# Patient Record
Sex: FEMALE | Race: WHITE | NOT HISPANIC OR LATINO
[De-identification: names, ages, dates, MRNs, and addresses within clinical notes are randomized per-mention and may not be internally consistent; named-entity substitution may affect disease eponyms.]

---

## 2022-04-21 ENCOUNTER — NON-APPOINTMENT (OUTPATIENT)
Age: 87
End: 2022-04-21

## 2022-04-26 PROBLEM — Z00.00 ENCOUNTER FOR PREVENTIVE HEALTH EXAMINATION: Status: ACTIVE | Noted: 2022-04-26

## 2022-05-02 ENCOUNTER — APPOINTMENT (OUTPATIENT)
Dept: CARDIOLOGY | Facility: CLINIC | Age: 87
End: 2022-05-02
Payer: MEDICARE

## 2022-05-02 VITALS — SYSTOLIC BLOOD PRESSURE: 120 MMHG | RESPIRATION RATE: 18 BRPM | DIASTOLIC BLOOD PRESSURE: 80 MMHG | HEART RATE: 84 BPM

## 2022-05-02 VITALS — BODY MASS INDEX: 26.21 KG/M2 | HEIGHT: 67 IN | WEIGHT: 167 LBS

## 2022-05-02 DIAGNOSIS — Z87.11 PERSONAL HISTORY OF PEPTIC ULCER DISEASE: ICD-10-CM

## 2022-05-02 DIAGNOSIS — Z87.891 PERSONAL HISTORY OF NICOTINE DEPENDENCE: ICD-10-CM

## 2022-05-02 DIAGNOSIS — G40.909 EPILEPSY, UNSPECIFIED, NOT INTRACTABLE, W/OUT STATUS EPILEPTICUS: ICD-10-CM

## 2022-05-02 DIAGNOSIS — Z80.8 FAMILY HISTORY OF MALIGNANT NEOPLASM OF OTHER ORGANS OR SYSTEMS: ICD-10-CM

## 2022-05-02 DIAGNOSIS — Z82.5 FAMILY HISTORY OF ASTHMA AND OTHER CHRONIC LOWER RESPIRATORY DISEASES: ICD-10-CM

## 2022-05-02 DIAGNOSIS — Z78.9 OTHER SPECIFIED HEALTH STATUS: ICD-10-CM

## 2022-05-02 DIAGNOSIS — Z82.3 FAMILY HISTORY OF STROKE: ICD-10-CM

## 2022-05-02 PROCEDURE — 93000 ELECTROCARDIOGRAM COMPLETE: CPT

## 2022-05-02 PROCEDURE — 99204 OFFICE O/P NEW MOD 45 MIN: CPT

## 2022-05-02 RX ORDER — LEVOTHYROXINE SODIUM 0.03 MG/1
25 TABLET ORAL
Qty: 90 | Refills: 0 | Status: ACTIVE | COMMUNITY
Start: 2022-04-21

## 2022-05-02 RX ORDER — LEVETIRACETAM 500 MG/1
500 TABLET, FILM COATED ORAL
Qty: 60 | Refills: 0 | Status: ACTIVE | COMMUNITY
Start: 2021-08-30

## 2022-05-02 NOTE — PHYSICAL EXAM
[Well Developed] : well developed [Well Nourished] : well nourished [No Acute Distress] : no acute distress [Normal Conjunctiva] : normal conjunctiva [Normal Venous Pressure] : normal venous pressure [No Carotid Bruit] : no carotid bruit [Normal S1, S2] : normal S1, S2 [No Murmur] : no murmur [No Rub] : no rub [No Gallop] : no gallop [Murmur] : murmur [Clear Lung Fields] : clear lung fields [Good Air Entry] : good air entry [No Respiratory Distress] : no respiratory distress  [Soft] : abdomen soft [Non Tender] : non-tender [No Masses/organomegaly] : no masses/organomegaly [Normal Bowel Sounds] : normal bowel sounds [Normal Gait] : normal gait [No Edema] : no edema [No Cyanosis] : no cyanosis [No Clubbing] : no clubbing [No Varicosities] : no varicosities [No Rash] : no rash [No Skin Lesions] : no skin lesions [Moves all extremities] : moves all extremities [No Focal Deficits] : no focal deficits [Normal Speech] : normal speech [Alert and Oriented] : alert and oriented [Normal memory] : normal memory [de-identified] : II VI systolic

## 2022-05-17 ENCOUNTER — APPOINTMENT (OUTPATIENT)
Dept: CARDIOLOGY | Facility: CLINIC | Age: 87
End: 2022-05-17
Payer: MEDICARE

## 2022-05-17 PROCEDURE — 93306 TTE W/DOPPLER COMPLETE: CPT

## 2022-06-01 ENCOUNTER — APPOINTMENT (OUTPATIENT)
Dept: CARDIOLOGY | Facility: CLINIC | Age: 87
End: 2022-06-01
Payer: MEDICARE

## 2022-06-01 VITALS
DIASTOLIC BLOOD PRESSURE: 80 MMHG | HEIGHT: 67 IN | OXYGEN SATURATION: 96 % | SYSTOLIC BLOOD PRESSURE: 118 MMHG | WEIGHT: 164 LBS | BODY MASS INDEX: 25.74 KG/M2 | TEMPERATURE: 96.7 F | HEART RATE: 81 BPM

## 2022-06-01 PROCEDURE — 93000 ELECTROCARDIOGRAM COMPLETE: CPT

## 2022-06-01 PROCEDURE — 99205 OFFICE O/P NEW HI 60 MIN: CPT

## 2022-06-01 RX ORDER — ALBUTEROL SULFATE 90 UG/1
108 (90 BASE) INHALANT RESPIRATORY (INHALATION)
Qty: 8 | Refills: 0 | Status: DISCONTINUED | COMMUNITY
Start: 2022-04-14 | End: 2022-06-01

## 2022-06-01 NOTE — HISTORY OF PRESENT ILLNESS
[FreeTextEntry1] : Ms. MILLER is a 87 year-year old female with history of HTN, paroxysmal atrial fibrillation, PUD with UGIB, COPD, seizure, Hypothyroidism  is here for management of Atrial Fibrillation.\par \par PUD- ~ 2 years ago- thought to be due to 'bacteria' as per the patient.\par No further bleeding.\par Last Hgb 14.3\par \par + fatigue\par \par Denies chest pain, shortness of breath, palpitation, dizziness or LOC except noted above.\par \par EKG (06/01/22): AF@ 81, QRSd 78\par TTE (05/22): EF 62%, Mild MR/TR/AR\par Cardio: Dr. Arcos\par \par

## 2022-06-01 NOTE — ASSESSMENT
[FreeTextEntry1] : ## persistent atrial fibrillation\par ## Hx of GI bleeding\par \par - On Xarelto. Compliant. No bleeding issues. Patient to contact us if there is any bleeding issues, interruption or any issues with OAC. Patient to go to ER/call 911 if any major bleeding. \par - Prior GI bleeding- stable for now. Discussed option of Watchman with her as an alternative to OAC. At this time, she prefers OAC.\par - Discussed management options including DCCV/AAD with her. Prefers to continue current management\par - - Discussed importance of risk factor management.\par - Sleep study/Management of YVETTE\par - Diet/exercise/weight loss\par - Management of GERD if present \par - Mx of Hypothyroidism as per PCP\par - Return as needed

## 2022-06-01 NOTE — PHYSICAL EXAM
[Irregularly Irregular] : irregularly irregular [Normal] : clear lung fields, good air entry, no respiratory distress

## 2022-07-24 ENCOUNTER — EMERGENCY (EMERGENCY)
Facility: HOSPITAL | Age: 87
LOS: 0 days | Discharge: HOME | End: 2022-07-24
Attending: EMERGENCY MEDICINE | Admitting: EMERGENCY MEDICINE

## 2022-07-24 VITALS
HEART RATE: 96 BPM | OXYGEN SATURATION: 95 % | TEMPERATURE: 100 F | HEIGHT: 67 IN | SYSTOLIC BLOOD PRESSURE: 146 MMHG | DIASTOLIC BLOOD PRESSURE: 84 MMHG | RESPIRATION RATE: 18 BRPM | WEIGHT: 160.06 LBS

## 2022-07-24 VITALS
OXYGEN SATURATION: 96 % | TEMPERATURE: 98 F | RESPIRATION RATE: 18 BRPM | DIASTOLIC BLOOD PRESSURE: 77 MMHG | SYSTOLIC BLOOD PRESSURE: 133 MMHG | HEART RATE: 94 BPM

## 2022-07-24 DIAGNOSIS — U07.1 COVID-19: ICD-10-CM

## 2022-07-24 DIAGNOSIS — Z87.09 PERSONAL HISTORY OF OTHER DISEASES OF THE RESPIRATORY SYSTEM: ICD-10-CM

## 2022-07-24 DIAGNOSIS — R53.1 WEAKNESS: ICD-10-CM

## 2022-07-24 DIAGNOSIS — E03.9 HYPOTHYROIDISM, UNSPECIFIED: ICD-10-CM

## 2022-07-24 DIAGNOSIS — G40.909 EPILEPSY, UNSPECIFIED, NOT INTRACTABLE, WITHOUT STATUS EPILEPTICUS: ICD-10-CM

## 2022-07-24 DIAGNOSIS — I10 ESSENTIAL (PRIMARY) HYPERTENSION: ICD-10-CM

## 2022-07-24 DIAGNOSIS — Z79.01 LONG TERM (CURRENT) USE OF ANTICOAGULANTS: ICD-10-CM

## 2022-07-24 DIAGNOSIS — I48.91 UNSPECIFIED ATRIAL FIBRILLATION: ICD-10-CM

## 2022-07-24 DIAGNOSIS — R05.1 ACUTE COUGH: ICD-10-CM

## 2022-07-24 DIAGNOSIS — Z87.891 PERSONAL HISTORY OF NICOTINE DEPENDENCE: ICD-10-CM

## 2022-07-24 LAB
ALBUMIN SERPL ELPH-MCNC: 4 G/DL — SIGNIFICANT CHANGE UP (ref 3.5–5.2)
ALP SERPL-CCNC: 102 U/L — SIGNIFICANT CHANGE UP (ref 30–115)
ALT FLD-CCNC: 36 U/L — SIGNIFICANT CHANGE UP (ref 0–41)
ANION GAP SERPL CALC-SCNC: 13 MMOL/L — SIGNIFICANT CHANGE UP (ref 7–14)
APTT BLD: 47.3 SEC — HIGH (ref 27–39.2)
AST SERPL-CCNC: 28 U/L — SIGNIFICANT CHANGE UP (ref 0–41)
BASOPHILS # BLD AUTO: 0.04 K/UL — SIGNIFICANT CHANGE UP (ref 0–0.2)
BASOPHILS NFR BLD AUTO: 0.5 % — SIGNIFICANT CHANGE UP (ref 0–1)
BILIRUB SERPL-MCNC: 0.6 MG/DL — SIGNIFICANT CHANGE UP (ref 0.2–1.2)
BUN SERPL-MCNC: 12 MG/DL — SIGNIFICANT CHANGE UP (ref 10–20)
CALCIUM SERPL-MCNC: 8.9 MG/DL — SIGNIFICANT CHANGE UP (ref 8.5–10.1)
CHLORIDE SERPL-SCNC: 103 MMOL/L — SIGNIFICANT CHANGE UP (ref 98–110)
CO2 SERPL-SCNC: 24 MMOL/L — SIGNIFICANT CHANGE UP (ref 17–32)
CREAT SERPL-MCNC: 0.7 MG/DL — SIGNIFICANT CHANGE UP (ref 0.7–1.5)
EGFR: 84 ML/MIN/1.73M2 — SIGNIFICANT CHANGE UP
EOSINOPHIL # BLD AUTO: 0.2 K/UL — SIGNIFICANT CHANGE UP (ref 0–0.7)
EOSINOPHIL NFR BLD AUTO: 2.7 % — SIGNIFICANT CHANGE UP (ref 0–8)
FLUAV AG NPH QL: SIGNIFICANT CHANGE UP
FLUBV AG NPH QL: SIGNIFICANT CHANGE UP
GLUCOSE SERPL-MCNC: 135 MG/DL — HIGH (ref 70–99)
HCT VFR BLD CALC: 40.9 % — SIGNIFICANT CHANGE UP (ref 37–47)
HGB BLD-MCNC: 13.5 G/DL — SIGNIFICANT CHANGE UP (ref 12–16)
IMM GRANULOCYTES NFR BLD AUTO: 0.1 % — SIGNIFICANT CHANGE UP (ref 0.1–0.3)
INR BLD: 2.47 RATIO — HIGH (ref 0.65–1.3)
LYMPHOCYTES # BLD AUTO: 0.89 K/UL — LOW (ref 1.2–3.4)
LYMPHOCYTES # BLD AUTO: 12.2 % — LOW (ref 20.5–51.1)
MAGNESIUM SERPL-MCNC: 1.9 MG/DL — SIGNIFICANT CHANGE UP (ref 1.8–2.4)
MCHC RBC-ENTMCNC: 27 PG — SIGNIFICANT CHANGE UP (ref 27–31)
MCHC RBC-ENTMCNC: 33 G/DL — SIGNIFICANT CHANGE UP (ref 32–37)
MCV RBC AUTO: 81.8 FL — SIGNIFICANT CHANGE UP (ref 81–99)
MONOCYTES # BLD AUTO: 0.76 K/UL — HIGH (ref 0.1–0.6)
MONOCYTES NFR BLD AUTO: 10.4 % — HIGH (ref 1.7–9.3)
NEUTROPHILS # BLD AUTO: 5.39 K/UL — SIGNIFICANT CHANGE UP (ref 1.4–6.5)
NEUTROPHILS NFR BLD AUTO: 74.1 % — SIGNIFICANT CHANGE UP (ref 42.2–75.2)
NRBC # BLD: 0 /100 WBCS — SIGNIFICANT CHANGE UP (ref 0–0)
NT-PROBNP SERPL-SCNC: 1443 PG/ML — HIGH (ref 0–300)
PLATELET # BLD AUTO: 190 K/UL — SIGNIFICANT CHANGE UP (ref 130–400)
POTASSIUM SERPL-MCNC: 4.4 MMOL/L — SIGNIFICANT CHANGE UP (ref 3.5–5)
POTASSIUM SERPL-SCNC: 4.4 MMOL/L — SIGNIFICANT CHANGE UP (ref 3.5–5)
PROT SERPL-MCNC: 6.3 G/DL — SIGNIFICANT CHANGE UP (ref 6–8)
PROTHROM AB SERPL-ACNC: 28.2 SEC — HIGH (ref 9.95–12.87)
RBC # BLD: 5 M/UL — SIGNIFICANT CHANGE UP (ref 4.2–5.4)
RBC # FLD: 14.5 % — SIGNIFICANT CHANGE UP (ref 11.5–14.5)
RSV RNA NPH QL NAA+NON-PROBE: SIGNIFICANT CHANGE UP
SARS-COV-2 RNA SPEC QL NAA+PROBE: DETECTED
SODIUM SERPL-SCNC: 140 MMOL/L — SIGNIFICANT CHANGE UP (ref 135–146)
TROPONIN T SERPL-MCNC: <0.01 NG/ML — SIGNIFICANT CHANGE UP
WBC # BLD: 7.29 K/UL — SIGNIFICANT CHANGE UP (ref 4.8–10.8)
WBC # FLD AUTO: 7.29 K/UL — SIGNIFICANT CHANGE UP (ref 4.8–10.8)

## 2022-07-24 PROCEDURE — 71045 X-RAY EXAM CHEST 1 VIEW: CPT | Mod: 26

## 2022-07-24 PROCEDURE — 99285 EMERGENCY DEPT VISIT HI MDM: CPT

## 2022-07-24 PROCEDURE — 93010 ELECTROCARDIOGRAM REPORT: CPT

## 2022-07-24 RX ORDER — LEVOTHYROXINE SODIUM 125 MCG
1 TABLET ORAL
Qty: 0 | Refills: 0 | DISCHARGE

## 2022-07-24 RX ORDER — DEXAMETHASONE 0.5 MG/5ML
1 ELIXIR ORAL
Qty: 1 | Refills: 0
Start: 2022-07-24 | End: 2022-07-24

## 2022-07-24 RX ORDER — FONDAPARINUX SODIUM 2.5 MG/.5ML
1 INJECTION, SOLUTION SUBCUTANEOUS
Qty: 0 | Refills: 0 | DISCHARGE

## 2022-07-24 RX ORDER — LEVETIRACETAM 250 MG/1
1 TABLET, FILM COATED ORAL
Qty: 0 | Refills: 0 | DISCHARGE

## 2022-07-24 NOTE — ED PROVIDER NOTE - CLINICAL SUMMARY MEDICAL DECISION MAKING FREE TEXT BOX
patient presents with cough and increasing weakness found to be covid + vital signs have stabilized we have offered admission at this time patient prefers to be discharged. advised to have a low threshold for return at this time understands plan of care and instructions

## 2022-07-24 NOTE — ED PROVIDER NOTE - PROVIDER TOKENS
FREE:[LAST:[your PMD],PHONE:[(   )    -],FAX:[(   )    -],FOLLOWUP:[1-3 Days]],PROVIDER:[TOKEN:[[id number]],FOLLOWUP:[1-3 Days]]

## 2022-07-24 NOTE — ED PROVIDER NOTE - PATIENT PORTAL LINK FT
You can access the FollowMyHealth Patient Portal offered by [facility] by registering at the following website: [email or web address]. By joining [facility]’s FollowMyHealth portal, you will also be able to view your health information using other applications (apps) compatible with our system.

## 2022-07-24 NOTE — ED PROVIDER NOTE - CARE PROVIDER_API CALL
your PMD,   Phone: (   )    -  Fax: (   )    -  Follow Up Time: 1-3 Days    Golden Du)  Critical Care Medicine; Internal Medicine; Pulmonary Disease; Sleep Medicine  [address] [place] [address]  Phone: [phone]  Fax: [phone]  Follow Up Time: 1-3 Days

## 2022-07-24 NOTE — ED PROVIDER NOTE - NSFOLLOWUPINSTRUCTIONS_ED_ALL_ED_FT
Novel Coronavirus (COVID-19)  The Facts  What is a coronavirus?  Coronaviruses are a large family of viruses that cause illnesses ranging from the common cold  to more severe diseases such as Middle East Respiratory Syndrome (MERS) and Severe Acute  Respiratory Syndrome (SARS).  What is Novel Coronavirus (COVID-19)?  COVID-19 is a new strain of Coronavirus that has not been previously identified in humans. COVID-19  was identified in [place], [place] in December 2019 (COVID-19). COVID-19 has  since been identified outside of [place], in a growing number of countries internationally, including  the [place].  Where can I find the most recent information about COVID-19?  The Centers for Disease Control and Prevention (CDC) is closely monitoring the outbreak caused by the  COVID-19. For the latest information about COVID- 19, visit the CDC website at  https://www.cdc.gov/coronavirus/index.html  How are coronaviruses spread?  Coronaviruses can be transmitted from person-to- person, usually after close contact with an infected person,  for example, in a household, workplace, or healthcare setting via droplets that become airborne after a cough  or sneeze by an affected person. These droplets can then infect a nearby person. It is likely transmission also  occurs by touching recently contaminated surfaces.  What are the symptoms of coronavirus infection?  It depends on the virus, but common signs include fever and/or respiratory symptoms such as  cough and shortness of breath. In more severe cases, infection can cause pneumonia, severe acute  respiratory syndrome, kidney failure and even death. Fortunately, most cases of COVID-19 have an  illness no different than the influenza “flu”. With a majority of these patients having mild symptoms  and overall mortality which appears to be not much different than the flu.  Is there a treatment for a COVID-19?  There is no specific treatment for disease caused by COVID-19. However, many of the symptoms can  be treated based on the patient’s clinical condition. Supportive care for infected persons can be highly  effective.  What can I do to protect myself?  Washing your hands, covering your cough, and disinfecting surfaces are the best precautionary  measures. It is also advisable to avoid close contact with anyone showing symptoms of respiratory  illness such as coughing and sneezing. Those with symptoms should wear a surgical mask when  around others.  What can I do to protect those around me?  If you have been identified as someone who may be infected with COVID-19, we recommend you  follow the self-isolation procedures outlined below to protect those around you and limit the spread  of this virus.   March 3, 2020  Recommendations for Patients Advised to Self-Isolate  for Possible COVID-19 Exposure  We recommend the below precautionary steps from now until 14 days from when you  returned from your travel or date of your last known possible contact:  - Do not go to work, school, or public areas. Avoid using public transportation, ride-sharing, or  taxis.  - As much as possible, separate yourself from other people in your home. If you can, you should  stay in a room and away from other people in your home. Also, you should use a separate  bathroom, if available.  - Wear the supplied mask whenever you are around other people.  - If you have a non-urgent medical appointment, please reschedule for a later date. If the  appointment is urgent, please call the healthcare provider and tell them that you are on selfisolation for possible COVID-19. This will help the healthcare provider’s office take steps to keep  other people from getting infected or exposed. If you can reschedule routine appointments, do  so.  - Wash your hands often with soap and water for at least 15 to 20 seconds or clean your hands  with an alcohol-based hand  that contains 60 to 95% alcohol, covering all surfaces of  your hands and rubbing them together until they feel dry. Soap and water should be used  preferentially if hands are visibly dirty.  - Cover your mouth and nose with a tissue when you cough or sneeze. Throw used tissues in a  lined trash can; immediately wash your hands.  - Avoid touching your eyes, nose, and mouth with your hands.  - Avoid sharing personal household items. You should not share dishes, drinking glasses, cups,  eating utensils, towels, or bedding with other people or pets in your home. After using these  items, they should be washed thoroughly with soap and water.  - Clean and disinfect all “high-touch” surfaces every day. High touch surfaces include counters,  tabletops, doorknobs, light switches, remote controls, bathroom fixtures, toilets, phones,  keyboards, tablets, and bedside tables. Also, clean any surfaces that may have blood, stool, or  body fluids on them.       If you develop worsening symptoms:  During your time on self-isolation do the following:  - Work from home if you are able to so.  - Limit social isolation by talking with friends and family on the phone or with face-time  - Talk with friends and relatives who don’t live with you about supporting each other if one  household has to be quarantined. For example, agree to drop groceries or other supplies at the  front door.  - Exercise and spend time outdoors away from others if able to do so.      You should return to the Emergency Department if you develop worse symptoms, trouble  breathing, chest pain, and/or a fever that doesn’t improve with over the counter  acetaminophen or ibuprofen.  Our Emergency Department Referral Coordinators will be reaching out ot you in the next 24-48 hours from 9:00am to 5:00pm (Monday to Friday) with a follow up appointment. Please expect a phone call from the hospital in that time frame. If you do not receive a call or if you have any questions or concerns, you can reach them at [phone] or [phone].

## 2022-07-24 NOTE — ED PROVIDER NOTE - ATTENDING CONTRIBUTION TO CARE
I was present for and supervised the key and critical aspects of the procedures performed during the care of the patient.  patient presents with cough and increasing weakness found to be covid + vital signs have stabilized we have offered admission at this time patient prefers to be discharged. advised to have a low threshold for return at this time understands plan of care and instructions

## 2022-07-24 NOTE — ED PROVIDER NOTE - OBJECTIVE STATEMENT
87-year-old female history of hypertension, bronchiectasis, seizures, A. fib on Xarelto complaining of cough and weakness x6 days.  No fevers, chills, nausea, vomiting.  No stuffy nose, runny nose, chest pains, shortness of breath, or body aches.  Patient had a  COVID test on Thursday which was negative.

## 2022-07-24 NOTE — ED PROVIDER NOTE - NS ED ROS FT
Review of Systems    Constitutional: (-) fever, (-) chills, (+) weakness  Eyes/ENT: (-) blurry vision, (-) epistaxis, (-) sore throat  Cardiovascular: (-) chest pain, (-) syncope  Respiratory: (+) cough, (-) shortness of breath  Gastrointestinal: (-) pain, (-) nausea, (-) vomiting, (-) diarrhea  Musculoskeletal: (-) neck pain, (-) back pain, (-) body aches  Integumentary: (-) rash, (-) edema  Neurological: (-) headache, (-) altered mental status

## 2022-07-25 PROBLEM — E03.9 HYPOTHYROIDISM, UNSPECIFIED: Chronic | Status: ACTIVE | Noted: 2022-07-24

## 2022-07-25 PROBLEM — I48.91 UNSPECIFIED ATRIAL FIBRILLATION: Chronic | Status: ACTIVE | Noted: 2022-07-24

## 2022-07-25 PROBLEM — Z87.898 PERSONAL HISTORY OF OTHER SPECIFIED CONDITIONS: Chronic | Status: ACTIVE | Noted: 2022-07-24

## 2022-07-25 PROBLEM — I10 ESSENTIAL (PRIMARY) HYPERTENSION: Chronic | Status: ACTIVE | Noted: 2022-07-24

## 2022-07-27 ENCOUNTER — INPATIENT (INPATIENT)
Facility: HOSPITAL | Age: 87
LOS: 5 days | Discharge: HOME HEALTH | End: 2022-08-02
Attending: HOSPITALIST | Admitting: HOSPITALIST

## 2022-07-27 VITALS
RESPIRATION RATE: 23 BRPM | OXYGEN SATURATION: 95 % | SYSTOLIC BLOOD PRESSURE: 202 MMHG | HEART RATE: 120 BPM | DIASTOLIC BLOOD PRESSURE: 102 MMHG | TEMPERATURE: 99 F | HEIGHT: 67 IN | WEIGHT: 184.97 LBS

## 2022-07-27 DIAGNOSIS — U07.1 COVID-19: ICD-10-CM

## 2022-07-27 LAB
ALBUMIN SERPL ELPH-MCNC: 3.7 G/DL — SIGNIFICANT CHANGE UP (ref 3.5–5.2)
ALBUMIN SERPL ELPH-MCNC: 4.2 G/DL — SIGNIFICANT CHANGE UP (ref 3.5–5.2)
ALP SERPL-CCNC: 113 U/L — SIGNIFICANT CHANGE UP (ref 30–115)
ALP SERPL-CCNC: 116 U/L — HIGH (ref 30–115)
ALT FLD-CCNC: 68 U/L — HIGH (ref 0–41)
ALT FLD-CCNC: 69 U/L — HIGH (ref 0–41)
ANION GAP SERPL CALC-SCNC: 11 MMOL/L — SIGNIFICANT CHANGE UP (ref 7–14)
APTT BLD: 30.7 SEC — SIGNIFICANT CHANGE UP (ref 27–39.2)
AST SERPL-CCNC: 46 U/L — HIGH (ref 0–41)
AST SERPL-CCNC: 48 U/L — HIGH (ref 0–41)
BASE EXCESS BLDV CALC-SCNC: 6.7 MMOL/L — HIGH (ref -2–3)
BASOPHILS # BLD AUTO: 0.05 K/UL — SIGNIFICANT CHANGE UP (ref 0–0.2)
BASOPHILS NFR BLD AUTO: 0.6 % — SIGNIFICANT CHANGE UP (ref 0–1)
BILIRUB DIRECT SERPL-MCNC: <0.2 MG/DL — SIGNIFICANT CHANGE UP (ref 0–0.3)
BILIRUB INDIRECT FLD-MCNC: >0.1 MG/DL — LOW (ref 0.2–1.2)
BILIRUB SERPL-MCNC: 0.3 MG/DL — SIGNIFICANT CHANGE UP (ref 0.2–1.2)
BILIRUB SERPL-MCNC: 0.5 MG/DL — SIGNIFICANT CHANGE UP (ref 0.2–1.2)
BUN SERPL-MCNC: 13 MG/DL — SIGNIFICANT CHANGE UP (ref 10–20)
CA-I SERPL-SCNC: 1.08 MMOL/L — LOW (ref 1.15–1.33)
CALCIUM SERPL-MCNC: 8.9 MG/DL — SIGNIFICANT CHANGE UP (ref 8.5–10.1)
CHLORIDE SERPL-SCNC: 97 MMOL/L — LOW (ref 98–110)
CO2 SERPL-SCNC: 27 MMOL/L — SIGNIFICANT CHANGE UP (ref 17–32)
CREAT SERPL-MCNC: 0.6 MG/DL — LOW (ref 0.7–1.5)
CREAT SERPL-MCNC: 0.9 MG/DL — SIGNIFICANT CHANGE UP (ref 0.7–1.5)
DACRYOCYTES BLD QL SMEAR: SLIGHT — SIGNIFICANT CHANGE UP
EGFR: 62 ML/MIN/1.73M2 — SIGNIFICANT CHANGE UP
EGFR: 87 ML/MIN/1.73M2 — SIGNIFICANT CHANGE UP
EOSINOPHIL # BLD AUTO: 0.02 K/UL — SIGNIFICANT CHANGE UP (ref 0–0.7)
EOSINOPHIL NFR BLD AUTO: 0.2 % — SIGNIFICANT CHANGE UP (ref 0–8)
GAS PNL BLDV: 129 MMOL/L — LOW (ref 136–145)
GAS PNL BLDV: SIGNIFICANT CHANGE UP
GLUCOSE SERPL-MCNC: 141 MG/DL — HIGH (ref 70–99)
HCO3 BLDV-SCNC: 30 MMOL/L — HIGH (ref 22–29)
HCT VFR BLD CALC: 43.4 % — SIGNIFICANT CHANGE UP (ref 37–47)
HCT VFR BLDA CALC: 45 % — SIGNIFICANT CHANGE UP (ref 39–51)
HGB BLD CALC-MCNC: 15.1 G/DL — SIGNIFICANT CHANGE UP (ref 12.6–17.4)
HGB BLD-MCNC: 14.4 G/DL — SIGNIFICANT CHANGE UP (ref 12–16)
IMM GRANULOCYTES NFR BLD AUTO: 0.4 % — HIGH (ref 0.1–0.3)
INR BLD: 1.39 RATIO — HIGH (ref 0.65–1.3)
INR BLD: 2.49 RATIO — HIGH (ref 0.65–1.3)
LACTATE BLDV-MCNC: 1.5 MMOL/L — SIGNIFICANT CHANGE UP (ref 0.5–2)
LG PLATELETS BLD QL AUTO: SLIGHT — SIGNIFICANT CHANGE UP
LYMPHOCYTES # BLD AUTO: 1.01 K/UL — LOW (ref 1.2–3.4)
LYMPHOCYTES # BLD AUTO: 12.5 % — LOW (ref 20.5–51.1)
MANUAL SMEAR VERIFICATION: SIGNIFICANT CHANGE UP
MCHC RBC-ENTMCNC: 26.9 PG — LOW (ref 27–31)
MCHC RBC-ENTMCNC: 33.2 G/DL — SIGNIFICANT CHANGE UP (ref 32–37)
MCV RBC AUTO: 81 FL — SIGNIFICANT CHANGE UP (ref 81–99)
MONOCYTES # BLD AUTO: 1.18 K/UL — HIGH (ref 0.1–0.6)
MONOCYTES NFR BLD AUTO: 14.7 % — HIGH (ref 1.7–9.3)
NEUTROPHILS # BLD AUTO: 5.76 K/UL — SIGNIFICANT CHANGE UP (ref 1.4–6.5)
NEUTROPHILS NFR BLD AUTO: 71.6 % — SIGNIFICANT CHANGE UP (ref 42.2–75.2)
NEUTS BAND # BLD: 8 % — HIGH (ref 0–6)
NRBC # BLD: 0 /100 WBCS — SIGNIFICANT CHANGE UP (ref 0–0)
NRBC # BLD: 0 /100 — SIGNIFICANT CHANGE UP (ref 0–0)
NT-PROBNP SERPL-SCNC: 1704 PG/ML — HIGH (ref 0–300)
PCO2 BLDV: 39 MMHG — SIGNIFICANT CHANGE UP (ref 39–42)
PH BLDV: 7.5 — HIGH (ref 7.32–7.43)
PLAT MORPH BLD: ABNORMAL
PLATELET # BLD AUTO: 240 K/UL — SIGNIFICANT CHANGE UP (ref 130–400)
PO2 BLDV: 46 MMHG — SIGNIFICANT CHANGE UP
POTASSIUM BLDV-SCNC: 4 MMOL/L — SIGNIFICANT CHANGE UP (ref 3.5–5.1)
POTASSIUM SERPL-MCNC: 4.1 MMOL/L — SIGNIFICANT CHANGE UP (ref 3.5–5)
POTASSIUM SERPL-SCNC: 4.1 MMOL/L — SIGNIFICANT CHANGE UP (ref 3.5–5)
PROT SERPL-MCNC: 6.5 G/DL — SIGNIFICANT CHANGE UP (ref 6–8)
PROT SERPL-MCNC: 6.8 G/DL — SIGNIFICANT CHANGE UP (ref 6–8)
PROTHROM AB SERPL-ACNC: 15.9 SEC — HIGH (ref 9.95–12.87)
PROTHROM AB SERPL-ACNC: 28.4 SEC — HIGH (ref 9.95–12.87)
RBC # BLD: 5.36 M/UL — SIGNIFICANT CHANGE UP (ref 4.2–5.4)
RBC # FLD: 14.3 % — SIGNIFICANT CHANGE UP (ref 11.5–14.5)
RBC BLD AUTO: ABNORMAL
SAO2 % BLDV: 81.4 % — SIGNIFICANT CHANGE UP
SARS-COV-2 RNA SPEC QL NAA+PROBE: DETECTED
SODIUM SERPL-SCNC: 135 MMOL/L — SIGNIFICANT CHANGE UP (ref 135–146)
TROPONIN T SERPL-MCNC: <0.01 NG/ML — SIGNIFICANT CHANGE UP
VARIANT LYMPHS # BLD: 1 % — SIGNIFICANT CHANGE UP (ref 0–5)
WBC # BLD: 8.05 K/UL — SIGNIFICANT CHANGE UP (ref 4.8–10.8)
WBC # FLD AUTO: 8.05 K/UL — SIGNIFICANT CHANGE UP (ref 4.8–10.8)

## 2022-07-27 PROCEDURE — 71045 X-RAY EXAM CHEST 1 VIEW: CPT | Mod: 26

## 2022-07-27 PROCEDURE — 93010 ELECTROCARDIOGRAM REPORT: CPT

## 2022-07-27 PROCEDURE — 99285 EMERGENCY DEPT VISIT HI MDM: CPT

## 2022-07-27 PROCEDURE — 99223 1ST HOSP IP/OBS HIGH 75: CPT

## 2022-07-27 RX ORDER — AMLODIPINE BESYLATE 2.5 MG/1
5 TABLET ORAL DAILY
Refills: 0 | Status: DISCONTINUED | OUTPATIENT
Start: 2022-07-27 | End: 2022-07-27

## 2022-07-27 RX ORDER — AMLODIPINE BESYLATE 2.5 MG/1
5 TABLET ORAL ONCE
Refills: 0 | Status: COMPLETED | OUTPATIENT
Start: 2022-07-27 | End: 2022-07-27

## 2022-07-27 RX ORDER — ACETAMINOPHEN 500 MG
975 TABLET ORAL ONCE
Refills: 0 | Status: COMPLETED | OUTPATIENT
Start: 2022-07-27 | End: 2022-07-27

## 2022-07-27 RX ORDER — AZITHROMYCIN 500 MG/1
500 TABLET, FILM COATED ORAL EVERY 24 HOURS
Refills: 0 | Status: DISCONTINUED | OUTPATIENT
Start: 2022-07-27 | End: 2022-07-30

## 2022-07-27 RX ORDER — LEVOTHYROXINE SODIUM 125 MCG
25 TABLET ORAL DAILY
Refills: 0 | Status: DISCONTINUED | OUTPATIENT
Start: 2022-07-27 | End: 2022-08-02

## 2022-07-27 RX ORDER — LEVETIRACETAM 250 MG/1
500 TABLET, FILM COATED ORAL
Refills: 0 | Status: DISCONTINUED | OUTPATIENT
Start: 2022-07-27 | End: 2022-08-02

## 2022-07-27 RX ORDER — METOPROLOL TARTRATE 50 MG
25 TABLET ORAL DAILY
Refills: 0 | Status: DISCONTINUED | OUTPATIENT
Start: 2022-07-27 | End: 2022-07-27

## 2022-07-27 RX ORDER — DEXAMETHASONE 0.5 MG/5ML
6 ELIXIR ORAL ONCE
Refills: 0 | Status: COMPLETED | OUTPATIENT
Start: 2022-07-27 | End: 2022-07-27

## 2022-07-27 RX ORDER — HYDROXYZINE HCL 10 MG
25 TABLET ORAL AT BEDTIME
Refills: 0 | Status: DISCONTINUED | OUTPATIENT
Start: 2022-07-27 | End: 2022-08-02

## 2022-07-27 RX ORDER — ACETAMINOPHEN 500 MG
650 TABLET ORAL EVERY 4 HOURS
Refills: 0 | Status: DISCONTINUED | OUTPATIENT
Start: 2022-07-27 | End: 2022-08-02

## 2022-07-27 RX ORDER — REMDESIVIR 5 MG/ML
200 INJECTION INTRAVENOUS EVERY 24 HOURS
Refills: 0 | Status: COMPLETED | OUTPATIENT
Start: 2022-07-27 | End: 2022-07-27

## 2022-07-27 RX ORDER — METOPROLOL TARTRATE 50 MG
25 TABLET ORAL
Refills: 0 | Status: DISCONTINUED | OUTPATIENT
Start: 2022-07-27 | End: 2022-07-28

## 2022-07-27 RX ORDER — SODIUM CHLORIDE 9 MG/ML
1000 INJECTION INTRAMUSCULAR; INTRAVENOUS; SUBCUTANEOUS ONCE
Refills: 0 | Status: COMPLETED | OUTPATIENT
Start: 2022-07-27 | End: 2022-07-27

## 2022-07-27 RX ORDER — RIVAROXABAN 15 MG-20MG
15 KIT ORAL DAILY
Refills: 0 | Status: DISCONTINUED | OUTPATIENT
Start: 2022-07-27 | End: 2022-08-02

## 2022-07-27 RX ORDER — CEFTRIAXONE 500 MG/1
1000 INJECTION, POWDER, FOR SOLUTION INTRAMUSCULAR; INTRAVENOUS ONCE
Refills: 0 | Status: COMPLETED | OUTPATIENT
Start: 2022-07-27 | End: 2022-07-27

## 2022-07-27 RX ORDER — REMDESIVIR 5 MG/ML
INJECTION INTRAVENOUS
Refills: 0 | Status: COMPLETED | OUTPATIENT
Start: 2022-07-27 | End: 2022-07-31

## 2022-07-27 RX ORDER — DEXAMETHASONE 0.5 MG/5ML
6 ELIXIR ORAL DAILY
Refills: 0 | Status: DISCONTINUED | OUTPATIENT
Start: 2022-07-27 | End: 2022-08-02

## 2022-07-27 RX ORDER — ALBUTEROL 90 UG/1
2.5 AEROSOL, METERED ORAL ONCE
Refills: 0 | Status: COMPLETED | OUTPATIENT
Start: 2022-07-27 | End: 2022-07-27

## 2022-07-27 RX ORDER — METOPROLOL TARTRATE 50 MG
25 TABLET ORAL ONCE
Refills: 0 | Status: COMPLETED | OUTPATIENT
Start: 2022-07-27 | End: 2022-07-27

## 2022-07-27 RX ORDER — AZITHROMYCIN 500 MG/1
500 TABLET, FILM COATED ORAL ONCE
Refills: 0 | Status: COMPLETED | OUTPATIENT
Start: 2022-07-27 | End: 2022-07-27

## 2022-07-27 RX ORDER — CEFTRIAXONE 500 MG/1
1000 INJECTION, POWDER, FOR SOLUTION INTRAMUSCULAR; INTRAVENOUS EVERY 24 HOURS
Refills: 0 | Status: DISCONTINUED | OUTPATIENT
Start: 2022-07-27 | End: 2022-07-30

## 2022-07-27 RX ORDER — REMDESIVIR 5 MG/ML
100 INJECTION INTRAVENOUS EVERY 24 HOURS
Refills: 0 | Status: COMPLETED | OUTPATIENT
Start: 2022-07-28 | End: 2022-07-31

## 2022-07-27 RX ADMIN — REMDESIVIR 500 MILLIGRAM(S): 5 INJECTION INTRAVENOUS at 19:34

## 2022-07-27 RX ADMIN — RIVAROXABAN 15 MILLIGRAM(S): KIT at 17:43

## 2022-07-27 RX ADMIN — Medication 25 MILLIGRAM(S): at 13:05

## 2022-07-27 RX ADMIN — ALBUTEROL 2.5 MILLIGRAM(S): 90 AEROSOL, METERED ORAL at 14:45

## 2022-07-27 RX ADMIN — Medication 6 MILLIGRAM(S): at 12:05

## 2022-07-27 RX ADMIN — CEFTRIAXONE 100 MILLIGRAM(S): 500 INJECTION, POWDER, FOR SOLUTION INTRAMUSCULAR; INTRAVENOUS at 13:56

## 2022-07-27 RX ADMIN — AMLODIPINE BESYLATE 5 MILLIGRAM(S): 2.5 TABLET ORAL at 13:05

## 2022-07-27 RX ADMIN — SODIUM CHLORIDE 1000 MILLILITER(S): 9 INJECTION INTRAMUSCULAR; INTRAVENOUS; SUBCUTANEOUS at 13:57

## 2022-07-27 RX ADMIN — Medication 975 MILLIGRAM(S): at 13:56

## 2022-07-27 RX ADMIN — LEVETIRACETAM 500 MILLIGRAM(S): 250 TABLET, FILM COATED ORAL at 17:43

## 2022-07-27 RX ADMIN — AZITHROMYCIN 255 MILLIGRAM(S): 500 TABLET, FILM COATED ORAL at 14:45

## 2022-07-27 RX ADMIN — Medication 25 MILLIGRAM(S): at 19:34

## 2022-07-27 NOTE — ED PROVIDER NOTE - ATTENDING APP SHARED VISIT CONTRIBUTION OF CARE
Patient is a 54-year-old female history of A. fib on anticoagulation who comes in for shortness of breath and inability to sleep.  She notes a nonproductive cough.  She is recently diagnosed with COVID.    Exam: Right-sided rhonchi, mild tachypnea, no increased work of breathing, rhinorrhea, soft nontender abdomen, cap refill less than 2 seconds, tachycardia  Plan: Labs, IV fluids, chest x-ray, EKG, antibiotics as indicated

## 2022-07-27 NOTE — ED PROVIDER NOTE - CARE PLAN
1 Principal Discharge DX:	Pneumonia due to COVID-19 virus  Secondary Diagnosis:	Tachycardia  Secondary Diagnosis:	Afib

## 2022-07-27 NOTE — ED PROVIDER NOTE - OBJECTIVE STATEMENT
patient c/o worsening SOB, chills, cough, URI sx for 1 week, Dx with covid 3 days ago, H/o A fib, did not take her BP meds todays, + fever, + chills, no chest pain, no N/V

## 2022-07-27 NOTE — H&P ADULT - NSHPLABSRESULTS_GEN_ALL_CORE
14.4   8.05  )-----------( 240      ( 27 Jul 2022 12:15 )             43.4       07-27    135  |  97<L>  |  13  ----------------------------<  141<H>  4.1   |  27  |  0.6<L>    Ca    8.9      27 Jul 2022 13:20    TPro  6.8  /  Alb  4.2  /  TBili  0.5  /  DBili  x   /  AST  46<H>  /  ALT  68<H>  /  AlkPhos  116<H>  07-27                      PT/INR - ( 27 Jul 2022 13:20 )   PT: 15.90 sec;   INR: 1.39 ratio         PTT - ( 27 Jul 2022 13:20 )  PTT:30.7 sec    Lactate Trend      CARDIAC MARKERS ( 27 Jul 2022 12:15 )  x     / <0.01 ng/mL / x     / x     / x            RADIOLOGY:  < from: Xray Chest 1 View- PORTABLE-Urgent (07.27.22 @ 13:01) >      ACC: [id number] EXAM:  XR CHEST PORTABLE URGENT 1V                          PROCEDURE DATE:  07/27/2022          INTERPRETATION:  Clinical History / Reason for exam: Covid    Comparison : Chest radiograph July 24, 2022.    Technique/Positioning: Frontal chest radiograph.    Findings:    Support devices: None.    Cardiac/mediastinum/hilum: Unchanged.    Lung parenchyma/Pleura: Unchanged right perihilar and left basilar   airspace opacities. No pneumothorax    Skeleton/soft tissues: Unchanged.    Impression:    Unchanged right perihilar and left basilar airspace opacities.    --- End of Report ---            JENNIFER SALINAS MD; Attending Radiologist  This document has been electronically signed. Jul 27 2022  2:32PM    < end of copied text >

## 2022-07-27 NOTE — H&P ADULT - NSHPPHYSICALEXAM_GEN_ALL_CORE
T(C): 37.2 (07-27-22 @ 11:51), Max: 37.2 (07-27-22 @ 11:51)  HR: 89 (07-27-22 @ 15:01) (89 - 122)  BP: 117/76 (07-27-22 @ 15:01) (117/76 - 202/102)  RR: 20 (07-27-22 @ 15:01) (20 - 23)  SpO2: 98% (07-27-22 @ 15:01) (93% - 98%)    PHYSICAL EXAM:  GENERAL: NAD, well-developed, well nourished, looks stated age  HEAD:  Atraumatic, Normocephalic  EYES: EOMI, PERRLA, conjunctiva and sclera clear  NECK: Supple, No JVD, no bruits, no masses, no thyroid enlargement  ENMT: No tonsillar erythema, exudated or enlargement, moist mucous membranes  CHEST/LUNG: Clear to auscultation bilaterally; poor inspiratory effort  HEART: S1,S2 irregular rate and rhythm; No murmurs, rubs, or gallops  ABDOMEN: Soft, nontender, nondistended, no rebound tenderness; No palpable masses, no hernias, no organomegaly; Bowel sounds present and normoactive  EXTREMITIES:  2+ peripheral pulses bilaterally and symmetrically, no clubbing, cyanosis, or edema  LYMPH: No lymphadenopathy  PSYCH: AAOx3, normal mood and affect  NEUROLOGY: non-focal, muscle strength 5/5 all extremities, DTRs 2+ symmetrically  SKIN: No rashes or lesions

## 2022-07-27 NOTE — H&P ADULT - ASSESSMENT
88 yo female with PMHx Afib, HTN, Hypothyroid and seizures admitted with COVID PNA and Afib with RVR.        Plan:  1. COVID PNA  - Admit to medicine  - Dexamethasone 6mg IV Q24  - RDV   - ID eval  - procalcitonin  - Rocephin/Zithromax IV    2. Afib with RVR  - Admit to LRT  - cardiac monitor  - Cont Metoprolol  - cont Xarelto (first dose now)  - check TSH    3. HTN  - monitor BP  - cont Norvasc and Metoprolol    4. Hypothyroid  - cont Synthroid    5. Seizures  - seizure precautions  - cont Keppra (first dose now as pt did not take this AM)    6. DVT Proph  - pt already on AC      GOC d/w pt - wants DNR/DNI - MOLST to be signed  All above d/w Dr Ca     86 yo female with PMHx Afib, HTN, Hypothyroid and seizures admitted with COVID PNA and Afib with RVR.        Plan:  1. COVID PNA  - Admit to medicine  - Dexamethasone 6mg IV Q24  - RDV   - ID eval  - procalcitonin  - Rocephin/Zithromax IV  -Cultures    2. Afib with RVR  - Admit to LRT  - cardiac monitor  - Cont Metoprolol  - cont Xarelto (first dose now)  - check TSH    3. HTN  - monitor BP  - cont Norvasc and Metoprolol    4. Hypothyroid  - cont Synthroid    5. Seizures  - seizure precautions  - cont Keppra (first dose now as pt did not take this AM)    6. DVT Proph  - pt already on AC      Code status discussed with patient, all option explored.  patient opted to DNR/DNI with full understanding of what it involves in details.  son, and RN at bedside

## 2022-07-27 NOTE — H&P ADULT - HISTORY OF PRESENT ILLNESS
88yo PMHx AFib, Hypothroid, HTN, Seizures began having coughing last week. Tested on Thursday and was negative but sxs got progressively worse and on Sunday she presented to ER and was diagnosed with COVID and sent home. Pt states she got progressively worse and has not slept because of the cough so she returned to ER today. Pt denies any CP, palpitations, abdominal pain, dysuria. Pt has no known sick contacts. Pt admits to vaccine x 3 - last dose unknown.

## 2022-07-27 NOTE — ED PROVIDER NOTE - CLINICAL SUMMARY MEDICAL DECISION MAKING FREE TEXT BOX
covid+ - new hypoxia and resp distress - possible PNA - admitted for further oxygen therapy and mgmt

## 2022-07-28 LAB
A1C WITH ESTIMATED AVERAGE GLUCOSE RESULT: 6.1 % — HIGH (ref 4–5.6)
ALBUMIN SERPL ELPH-MCNC: 3.7 G/DL — SIGNIFICANT CHANGE UP (ref 3.5–5.2)
ALBUMIN SERPL ELPH-MCNC: 3.9 G/DL — SIGNIFICANT CHANGE UP (ref 3.5–5.2)
ALP SERPL-CCNC: 125 U/L — HIGH (ref 30–115)
ALP SERPL-CCNC: 129 U/L — HIGH (ref 30–115)
ALT FLD-CCNC: 70 U/L — HIGH (ref 0–41)
ALT FLD-CCNC: 70 U/L — HIGH (ref 0–41)
ANION GAP SERPL CALC-SCNC: 13 MMOL/L — SIGNIFICANT CHANGE UP (ref 7–14)
AST SERPL-CCNC: 42 U/L — HIGH (ref 0–41)
AST SERPL-CCNC: 42 U/L — HIGH (ref 0–41)
BILIRUB DIRECT SERPL-MCNC: <0.2 MG/DL — SIGNIFICANT CHANGE UP (ref 0–0.3)
BILIRUB INDIRECT FLD-MCNC: >0.1 MG/DL — LOW (ref 0.2–1.2)
BILIRUB SERPL-MCNC: 0.3 MG/DL — SIGNIFICANT CHANGE UP (ref 0.2–1.2)
BILIRUB SERPL-MCNC: 0.3 MG/DL — SIGNIFICANT CHANGE UP (ref 0.2–1.2)
BUN SERPL-MCNC: 14 MG/DL — SIGNIFICANT CHANGE UP (ref 10–20)
CALCIUM SERPL-MCNC: 8.8 MG/DL — SIGNIFICANT CHANGE UP (ref 8.5–10.1)
CHLORIDE SERPL-SCNC: 102 MMOL/L — SIGNIFICANT CHANGE UP (ref 98–110)
CO2 SERPL-SCNC: 25 MMOL/L — SIGNIFICANT CHANGE UP (ref 17–32)
CREAT SERPL-MCNC: 0.6 MG/DL — LOW (ref 0.7–1.5)
CREAT SERPL-MCNC: 0.6 MG/DL — LOW (ref 0.7–1.5)
EGFR: 87 ML/MIN/1.73M2 — SIGNIFICANT CHANGE UP
EGFR: 87 ML/MIN/1.73M2 — SIGNIFICANT CHANGE UP
ESTIMATED AVERAGE GLUCOSE: 128 MG/DL — HIGH (ref 68–114)
GLUCOSE SERPL-MCNC: 110 MG/DL — HIGH (ref 70–99)
HCT VFR BLD CALC: 41.3 % — SIGNIFICANT CHANGE UP (ref 37–47)
HGB BLD-MCNC: 13.6 G/DL — SIGNIFICANT CHANGE UP (ref 12–16)
INR BLD: 1.58 RATIO — HIGH (ref 0.65–1.3)
MAGNESIUM SERPL-MCNC: 2 MG/DL — SIGNIFICANT CHANGE UP (ref 1.8–2.4)
MCHC RBC-ENTMCNC: 27 PG — SIGNIFICANT CHANGE UP (ref 27–31)
MCHC RBC-ENTMCNC: 32.9 G/DL — SIGNIFICANT CHANGE UP (ref 32–37)
MCV RBC AUTO: 81.9 FL — SIGNIFICANT CHANGE UP (ref 81–99)
NRBC # BLD: 0 /100 WBCS — SIGNIFICANT CHANGE UP (ref 0–0)
PLATELET # BLD AUTO: 227 K/UL — SIGNIFICANT CHANGE UP (ref 130–400)
POTASSIUM SERPL-MCNC: 4.5 MMOL/L — SIGNIFICANT CHANGE UP (ref 3.5–5)
POTASSIUM SERPL-SCNC: 4.5 MMOL/L — SIGNIFICANT CHANGE UP (ref 3.5–5)
PROT SERPL-MCNC: 6.5 G/DL — SIGNIFICANT CHANGE UP (ref 6–8)
PROT SERPL-MCNC: 6.5 G/DL — SIGNIFICANT CHANGE UP (ref 6–8)
PROTHROM AB SERPL-ACNC: 18.1 SEC — HIGH (ref 9.95–12.87)
RBC # BLD: 5.04 M/UL — SIGNIFICANT CHANGE UP (ref 4.2–5.4)
RBC # FLD: 14.4 % — SIGNIFICANT CHANGE UP (ref 11.5–14.5)
SODIUM SERPL-SCNC: 140 MMOL/L — SIGNIFICANT CHANGE UP (ref 135–146)
TSH SERPL-MCNC: 1.51 UIU/ML — SIGNIFICANT CHANGE UP (ref 0.27–4.2)
WBC # BLD: 8.15 K/UL — SIGNIFICANT CHANGE UP (ref 4.8–10.8)
WBC # FLD AUTO: 8.15 K/UL — SIGNIFICANT CHANGE UP (ref 4.8–10.8)

## 2022-07-28 PROCEDURE — 76700 US EXAM ABDOM COMPLETE: CPT | Mod: 26

## 2022-07-28 PROCEDURE — 99232 SBSQ HOSP IP/OBS MODERATE 35: CPT

## 2022-07-28 RX ORDER — METOPROLOL TARTRATE 50 MG
25 TABLET ORAL EVERY 6 HOURS
Refills: 0 | Status: DISCONTINUED | OUTPATIENT
Start: 2022-07-28 | End: 2022-08-02

## 2022-07-28 RX ADMIN — Medication 25 MILLIGRAM(S): at 17:44

## 2022-07-28 RX ADMIN — CEFTRIAXONE 100 MILLIGRAM(S): 500 INJECTION, POWDER, FOR SOLUTION INTRAMUSCULAR; INTRAVENOUS at 14:12

## 2022-07-28 RX ADMIN — REMDESIVIR 500 MILLIGRAM(S): 5 INJECTION INTRAVENOUS at 21:56

## 2022-07-28 RX ADMIN — Medication 100 MILLIGRAM(S): at 21:57

## 2022-07-28 RX ADMIN — RIVAROXABAN 15 MILLIGRAM(S): KIT at 11:22

## 2022-07-28 RX ADMIN — Medication 25 MICROGRAM(S): at 06:24

## 2022-07-28 RX ADMIN — LEVETIRACETAM 500 MILLIGRAM(S): 250 TABLET, FILM COATED ORAL at 06:25

## 2022-07-28 RX ADMIN — AZITHROMYCIN 255 MILLIGRAM(S): 500 TABLET, FILM COATED ORAL at 14:12

## 2022-07-28 RX ADMIN — Medication 100 MILLIGRAM(S): at 14:11

## 2022-07-28 RX ADMIN — Medication 25 MILLIGRAM(S): at 06:20

## 2022-07-28 RX ADMIN — LEVETIRACETAM 500 MILLIGRAM(S): 250 TABLET, FILM COATED ORAL at 17:44

## 2022-07-28 RX ADMIN — Medication 6 MILLIGRAM(S): at 06:20

## 2022-07-28 NOTE — PATIENT PROFILE ADULT - FALL HARM RISK - HARM RISK INTERVENTIONS

## 2022-07-28 NOTE — PROGRESS NOTE ADULT - ASSESSMENT
86 yo female with PMHx Afib, HTN, Hypothyroid and seizures admitted with COVID PNA and Afib with RVR.        Plan:  1. COVID PNA  -Continue with - Dexamethasone 6mg IV Q24,  RDV, and IV antibiotic   - ID eval pending  - f/u procalcitonin level and cultures    2.Acute on Chronic  Afib with RVR  - Will switch metoprolol to metoprolol 25 mg q6hrs with hold parameter  - cont Xarelto   - follow up TSH level    3. HTN  - monitor BP  - cont  Metoprolol    4. Hypothyroid  - cont Synthroid    5. Seizures  - seizure precautions  - cont Keppra (first dose now as pt did not take this AM)    6. DVT Proph  - pt already on AC      Code status discussed with patient, all option explored.  patient opted to DNR/DNI with full understanding of what it involves in details.  son, and RN at bedside     88 yo female with PMHx Afib, HTN, Hypothyroid and seizures admitted with COVID PNA and Afib with RVR.        1. COVID PNA  -Continue with - Dexamethasone 6mg IV Q24,  RDV, and IV antibiotic   - ID eval pending  - f/u procalcitonin level and cultures    2.Acute on Chronic  Afib with RVR  - Will switch metoprolol to metoprolol 25 mg q6hrs with hold parameter  - cont Xarelto   - follow up TSH level    3. HTN  - monitor BP  - cont  Metoprolol    4. Hypothyroid  - cont Synthroid    5. Seizures  - seizure precautions  - cont Keppra (first dose now as pt did not take this AM)    6. DVT Proph  - pt already on AC    7.  Abnormal LFT  -Abdm US  -Monitor LFT      Code status discussed with patient, all option explored.  patient opted to DNR/DNI with full understanding of what it involves in details.  son, and RN at bedside

## 2022-07-29 LAB
ALBUMIN SERPL ELPH-MCNC: 3.6 G/DL — SIGNIFICANT CHANGE UP (ref 3.5–5.2)
ALP SERPL-CCNC: 117 U/L — HIGH (ref 30–115)
ALT FLD-CCNC: 72 U/L — HIGH (ref 0–41)
ANION GAP SERPL CALC-SCNC: 10 MMOL/L — SIGNIFICANT CHANGE UP (ref 7–14)
AST SERPL-CCNC: 37 U/L — SIGNIFICANT CHANGE UP (ref 0–41)
BILIRUB SERPL-MCNC: 0.2 MG/DL — SIGNIFICANT CHANGE UP (ref 0.2–1.2)
BUN SERPL-MCNC: 18 MG/DL — SIGNIFICANT CHANGE UP (ref 10–20)
CALCIUM SERPL-MCNC: 8.7 MG/DL — SIGNIFICANT CHANGE UP (ref 8.5–10.1)
CHLORIDE SERPL-SCNC: 101 MMOL/L — SIGNIFICANT CHANGE UP (ref 98–110)
CO2 SERPL-SCNC: 26 MMOL/L — SIGNIFICANT CHANGE UP (ref 17–32)
CREAT SERPL-MCNC: 0.5 MG/DL — LOW (ref 0.7–1.5)
EGFR: 91 ML/MIN/1.73M2 — SIGNIFICANT CHANGE UP
GLUCOSE SERPL-MCNC: 146 MG/DL — HIGH (ref 70–99)
HCT VFR BLD CALC: 39.8 % — SIGNIFICANT CHANGE UP (ref 37–47)
HGB BLD-MCNC: 13.1 G/DL — SIGNIFICANT CHANGE UP (ref 12–16)
INR BLD: 1.4 RATIO — HIGH (ref 0.65–1.3)
MCHC RBC-ENTMCNC: 27.1 PG — SIGNIFICANT CHANGE UP (ref 27–31)
MCHC RBC-ENTMCNC: 32.9 G/DL — SIGNIFICANT CHANGE UP (ref 32–37)
MCV RBC AUTO: 82.4 FL — SIGNIFICANT CHANGE UP (ref 81–99)
NRBC # BLD: 0 /100 WBCS — SIGNIFICANT CHANGE UP (ref 0–0)
PLATELET # BLD AUTO: 220 K/UL — SIGNIFICANT CHANGE UP (ref 130–400)
POTASSIUM SERPL-MCNC: 4.7 MMOL/L — SIGNIFICANT CHANGE UP (ref 3.5–5)
POTASSIUM SERPL-SCNC: 4.7 MMOL/L — SIGNIFICANT CHANGE UP (ref 3.5–5)
PROCALCITONIN SERPL-MCNC: 0.13 NG/ML — HIGH (ref 0.02–0.1)
PROT SERPL-MCNC: 6.1 G/DL — SIGNIFICANT CHANGE UP (ref 6–8)
PROTHROM AB SERPL-ACNC: 16.1 SEC — HIGH (ref 9.95–12.87)
RBC # BLD: 4.83 M/UL — SIGNIFICANT CHANGE UP (ref 4.2–5.4)
RBC # FLD: 14.3 % — SIGNIFICANT CHANGE UP (ref 11.5–14.5)
SODIUM SERPL-SCNC: 137 MMOL/L — SIGNIFICANT CHANGE UP (ref 135–146)
WBC # BLD: 8.68 K/UL — SIGNIFICANT CHANGE UP (ref 4.8–10.8)
WBC # FLD AUTO: 8.68 K/UL — SIGNIFICANT CHANGE UP (ref 4.8–10.8)

## 2022-07-29 PROCEDURE — 99232 SBSQ HOSP IP/OBS MODERATE 35: CPT

## 2022-07-29 RX ADMIN — Medication 25 MILLIGRAM(S): at 17:37

## 2022-07-29 RX ADMIN — CEFTRIAXONE 100 MILLIGRAM(S): 500 INJECTION, POWDER, FOR SOLUTION INTRAMUSCULAR; INTRAVENOUS at 15:02

## 2022-07-29 RX ADMIN — Medication 25 MICROGRAM(S): at 05:25

## 2022-07-29 RX ADMIN — AZITHROMYCIN 255 MILLIGRAM(S): 500 TABLET, FILM COATED ORAL at 15:02

## 2022-07-29 RX ADMIN — Medication 100 MILLIGRAM(S): at 21:21

## 2022-07-29 RX ADMIN — Medication 25 MILLIGRAM(S): at 00:12

## 2022-07-29 RX ADMIN — REMDESIVIR 500 MILLIGRAM(S): 5 INJECTION INTRAVENOUS at 21:22

## 2022-07-29 RX ADMIN — Medication 25 MILLIGRAM(S): at 05:25

## 2022-07-29 RX ADMIN — Medication 600 MILLIGRAM(S): at 21:21

## 2022-07-29 RX ADMIN — LEVETIRACETAM 500 MILLIGRAM(S): 250 TABLET, FILM COATED ORAL at 17:37

## 2022-07-29 RX ADMIN — RIVAROXABAN 15 MILLIGRAM(S): KIT at 11:58

## 2022-07-29 RX ADMIN — Medication 6 MILLIGRAM(S): at 05:25

## 2022-07-29 RX ADMIN — Medication 25 MILLIGRAM(S): at 11:58

## 2022-07-29 RX ADMIN — LEVETIRACETAM 500 MILLIGRAM(S): 250 TABLET, FILM COATED ORAL at 05:25

## 2022-07-29 NOTE — CONSULT NOTE ADULT - SUBJECTIVE AND OBJECTIVE BOX
BRADLY CASTRO  87y, Female  Allergy: No Known Allergies      CHIEF COMPLAINT: COVID 19 (28 Jul 2022 15:58)      LOS  2d    HPI:  86yo PMHx AFib, Hypothroid, HTN, Seizures began having coughing last week. Tested on Thursday and was negative but sxs got progressively worse and on Sunday she presented to ER and was diagnosed with COVID and sent home. Pt states she got progressively worse and has not slept because of the cough so she returned to ER today. Pt denies any CP, palpitations, abdominal pain, dysuria. Pt has no known sick contacts. Pt admits to vaccine x 3 - last dose unknown.  (27 Jul 2022 16:46)      INFECTIOUS DISEASE HISTORY:  History as above.     PAST MEDICAL & SURGICAL HISTORY:  Hypothyroid      Afib      HTN (hypertension)      History of seizure          FAMILY HISTORY      SOCIAL HISTORY  Social History:  Lives with son  Quit smoking 30 years ago  No ETOH (27 Jul 2022 16:46)        ROS  General: Denies rigors, nightsweats  HEENT: Denies headache, rhinorrhea, sore throat, eye pain  CV: Denies CP, palpitations  PULM: Denies wheezing, hemoptysis  GI: Denies hematemesis, hematochezia, melena  : Denies discharge, hematuria  MSK: Denies arthralgias, myalgias  SKIN: Denies rash, lesions  NEURO: Denies paresthesias, weakness  PSYCH: Denies depression, anxiety    VITALS:  T(F): 97, Max: 98 (07-28-22 @ 21:15)  HR: 71  BP: 135/75  RR: 18Vital Signs Last 24 Hrs  T(C): 36.1 (29 Jul 2022 05:00), Max: 36.7 (28 Jul 2022 21:15)  T(F): 97 (29 Jul 2022 05:00), Max: 98 (28 Jul 2022 21:15)  HR: 71 (29 Jul 2022 05:00) (71 - 110)  BP: 135/75 (29 Jul 2022 05:00) (132/78 - 153/83)  BP(mean): --  RR: 18 (29 Jul 2022 05:00) (18 - 18)  SpO2: 95% (29 Jul 2022 05:30) (92% - 97%)    Parameters below as of 29 Jul 2022 05:30  Patient On (Oxygen Delivery Method): nasal cannula  O2 Flow (L/min): 3      PHYSICAL EXAM:  Gen: NAD, resting in bed  HEENT: Normocephalic, atraumatic  Neck: supple, no lymphadenopathy  CV: Regular rate & regular rhythm  Lungs: decreased BS at bases, no fremitus  Abdomen: Soft, BS present  Ext: Warm, well perfused  Neuro: non focal, awake  Skin: no rash, no erythema  Lines: no phlebitis    TESTS & MEASUREMENTS:                        13.1   8.68  )-----------( 220      ( 29 Jul 2022 07:45 )             39.8     07-28    x   |  x   |  x   ----------------------------<  x   x    |  x   |  0.6<L>    Ca    8.8      28 Jul 2022 06:35  Mg     2.0     07-28    TPro  6.5  /  Alb  3.7  /  TBili  0.3  /  DBili  <0.2  /  AST  42<H>  /  ALT  70<H>  /  AlkPhos  125<H>  07-28      LIVER FUNCTIONS - ( 28 Jul 2022 08:46 )  Alb: 3.7 g/dL / Pro: 6.5 g/dL / ALK PHOS: 125 U/L / ALT: 70 U/L / AST: 42 U/L / GGT: x               Culture - Blood (collected 07-27-22 @ 12:15)  Source: .Blood Blood  Preliminary Report (07-28-22 @ 22:02):    No growth to date.    Culture - Blood (collected 07-27-22 @ 12:15)  Source: .Blood Blood  Preliminary Report (07-28-22 @ 22:02):    No growth to date.        Blood Gas Venous - Lactate: 1.50 mmol/L (07-27-22 @ 12:47)      INFECTIOUS DISEASES TESTING  Procalcitonin, Serum: 0.13 ng/mL (07-28-22 @ 06:35)  COVID-19 PCR: Detected (07-27-22 @ 11:54)      RADIOLOGY & ADDITIONAL TESTS:  I have personally reviewed the last Chest xray  CXR  Xray Chest 1 View- PORTABLE-Urgent:   ACC: [id number] EXAM:  XR CHEST PORTABLE URGENT 1V                          PROCEDURE DATE:  07/27/2022          INTERPRETATION:  Clinical History / Reason for exam: Covid    Comparison : Chest radiograph July 24, 2022.    Technique/Positioning: Frontal chest radiograph.    Findings:    Support devices: None.    Cardiac/mediastinum/hilum: Unchanged.    Lung parenchyma/Pleura: Unchanged right perihilar and left basilar   airspace opacities. No pneumothorax    Skeleton/soft tissues: Unchanged.    Impression:    Unchanged right perihilar and left basilar airspace opacities.    --- End of Report ---            JENNIFER SALINAS MD; Attending Radiologist  This document has been electronically signed. Jul 27 2022  2:32PM (07-27-22 @ 13:01)      CT      CARDIOLOGY TESTING  12 Lead ECG:   Ventricular Rate 115 BPM    Atrial Rate 300 BPM    QRS Duration 68 ms    Q-T Interval 312 ms    QTC Calculation(Bazett) 431 ms    R Axis 106 degrees    T Axis 27 degrees    Diagnosis Line Atrial fibrillation with rapid ventricular response  Rightward axis  Low voltage QRS  Cannot rule out Anteroseptal infarct , age undetermined  Abnormal ECG    Confirmed by YA MORIN MD ([id number]) on 7/27/2022 2:58:20 PM (07-27-22 @ 12:58)  12 Lead ECG:   Ventricular Rate 96 BPM    Atrial Rate 416 BPM    QRS Duration 66 ms    Q-T Interval 356 ms    QTC Calculation(Bazett) 449 ms    R Axis 123 degrees    T Axis 2 degrees    Diagnosis Line Atrial fibrillation  Right axis deviation  Low voltage QRS      Confirmed by YA MORIN MD ([id number]) on 7/25/2022 8:54:11 AM (07-24-22 @ 12:32)      MEDICATIONS  azithromycin  IVPB 500 IV Intermittent every 24 hours  cefTRIAXone   IVPB 1000 IV Intermittent every 24 hours  dexAMETHasone  Injectable 6 IV Push daily  levETIRAcetam 500 Oral two times a day  levothyroxine 25 Oral daily  metoprolol tartrate 25 Oral every 6 hours  remdesivir  IVPB 100 IV Intermittent every 24 hours  remdesivir  IVPB  IV Intermittent   rivaroxaban 15 Oral daily      Weight  Weight (kg): 76 (07-28-22 @ 16:15)    ANTIBIOTICS:  azithromycin  IVPB 500 milliGRAM(s) IV Intermittent every 24 hours  cefTRIAXone   IVPB 1000 milliGRAM(s) IV Intermittent every 24 hours  remdesivir  IVPB 100 milliGRAM(s) IV Intermittent every 24 hours  remdesivir  IVPB   IV Intermittent       ALLERGIES:  No Known Allergies      ASSESSMENT  86yo PMHx AFib, Hypothroid, HTN, Seizures began having coughing last week who presents with COVID    IMPRESSION  #COVID-19, severe  #Atrial Fibrillation  #Seizures  #Obesity BMI (kg/m2): 26.2, 25.1  #Abx allergy: NKDA      RECOMMENDATIONS  This is a preliminary incomplete pended note, all final recommendations to follow after interview and examination of the patient.    Please call or message on [employer] Teams if with any questions.  Spectra [id number]     BRADLY CASTRO  87y, Female  Allergy: No Known Allergies      CHIEF COMPLAINT: COVID 19 (28 Jul 2022 15:58)      LOS  2d    HPI:  86yo PMHx AFib, Hypothroid, HTN, Seizures began having coughing last week. Tested on Thursday and was negative but sxs got progressively worse and on Sunday she presented to ER and was diagnosed with COVID and sent home. Pt states she got progressively worse and has not slept because of the cough so she returned to ER today. Pt denies any CP, palpitations, abdominal pain, dysuria. Pt has no known sick contacts. Pt admits to vaccine x 3 - last dose unknown.  (27 Jul 2022 16:46)      INFECTIOUS DISEASE HISTORY:  History as above.   Currently on 3L NC.   Feels better compared to admission.   Denies any abdominal pain, nausea, vomiting.   Coughing is stable.     PAST MEDICAL & SURGICAL HISTORY:  Hypothyroid      Afib      HTN (hypertension)      History of seizure          FAMILY HISTORY  Family Hx reviewed and non-contributory     SOCIAL HISTORY  Social History:  Lives with son  Quit smoking 30 years ago  No ETOH (27 Jul 2022 16:46)        ROS  General: Denies rigors, nightsweats  HEENT: Denies headache, rhinorrhea, sore throat, eye pain  CV: Denies CP, palpitations  PULM: Denies wheezing, hemoptysis  GI: Denies hematemesis, hematochezia, melena  : Denies discharge, hematuria  MSK: Denies arthralgias, myalgias  SKIN: Denies rash, lesions  NEURO: Denies paresthesias, weakness  PSYCH: Denies depression, anxiety    VITALS:  T(F): 97, Max: 98 (07-28-22 @ 21:15)  HR: 71  BP: 135/75  RR: 18Vital Signs Last 24 Hrs  T(C): 36.1 (29 Jul 2022 05:00), Max: 36.7 (28 Jul 2022 21:15)  T(F): 97 (29 Jul 2022 05:00), Max: 98 (28 Jul 2022 21:15)  HR: 71 (29 Jul 2022 05:00) (71 - 110)  BP: 135/75 (29 Jul 2022 05:00) (132/78 - 153/83)  BP(mean): --  RR: 18 (29 Jul 2022 05:00) (18 - 18)  SpO2: 95% (29 Jul 2022 05:30) (92% - 97%)    Parameters below as of 29 Jul 2022 05:30  Patient On (Oxygen Delivery Method): nasal cannula  O2 Flow (L/min): 3      PHYSICAL EXAM:  Gen: NAD, resting in bed  HEENT: Normocephalic, atraumatic  Neck: supple, no lymphadenopathy  CV: Regular rate & regular rhythm  Lungs: decreased BS at bases, no fremitus  Abdomen: Soft, BS present  Ext: Warm, well perfused  Neuro: non focal, awake  Skin: no rash, no erythema  Lines: no phlebitis    TESTS & MEASUREMENTS:                        13.1   8.68  )-----------( 220      ( 29 Jul 2022 07:45 )             39.8     07-28    x   |  x   |  x   ----------------------------<  x   x    |  x   |  0.6<L>    Ca    8.8      28 Jul 2022 06:35  Mg     2.0     07-28    TPro  6.5  /  Alb  3.7  /  TBili  0.3  /  DBili  <0.2  /  AST  42<H>  /  ALT  70<H>  /  AlkPhos  125<H>  07-28      LIVER FUNCTIONS - ( 28 Jul 2022 08:46 )  Alb: 3.7 g/dL / Pro: 6.5 g/dL / ALK PHOS: 125 U/L / ALT: 70 U/L / AST: 42 U/L / GGT: x               Culture - Blood (collected 07-27-22 @ 12:15)  Source: .Blood Blood  Preliminary Report (07-28-22 @ 22:02):    No growth to date.    Culture - Blood (collected 07-27-22 @ 12:15)  Source: .Blood Blood  Preliminary Report (07-28-22 @ 22:02):    No growth to date.        Blood Gas Venous - Lactate: 1.50 mmol/L (07-27-22 @ 12:47)      INFECTIOUS DISEASES TESTING  Procalcitonin, Serum: 0.13 ng/mL (07-28-22 @ 06:35)  COVID-19 PCR: Detected (07-27-22 @ 11:54)      RADIOLOGY & ADDITIONAL TESTS:  I have personally reviewed the last Chest xray  CXR  Xray Chest 1 View- PORTABLE-Urgent:   ACC: [id number] EXAM:  XR CHEST PORTABLE URGENT 1V                          PROCEDURE DATE:  07/27/2022          INTERPRETATION:  Clinical History / Reason for exam: Covid    Comparison : Chest radiograph July 24, 2022.    Technique/Positioning: Frontal chest radiograph.    Findings:    Support devices: None.    Cardiac/mediastinum/hilum: Unchanged.    Lung parenchyma/Pleura: Unchanged right perihilar and left basilar   airspace opacities. No pneumothorax    Skeleton/soft tissues: Unchanged.    Impression:    Unchanged right perihilar and left basilar airspace opacities.    --- End of Report ---            JENNIFER SALINAS MD; Attending Radiologist  This document has been electronically signed. Jul 27 2022  2:32PM (07-27-22 @ 13:01)      CT      CARDIOLOGY TESTING  12 Lead ECG:   Ventricular Rate 115 BPM    Atrial Rate 300 BPM    QRS Duration 68 ms    Q-T Interval 312 ms    QTC Calculation(Bazett) 431 ms    R Axis 106 degrees    T Axis 27 degrees    Diagnosis Line Atrial fibrillation with rapid ventricular response  Rightward axis  Low voltage QRS  Cannot rule out Anteroseptal infarct , age undetermined  Abnormal ECG    Confirmed by YA MORIN MD ([id number]) on 7/27/2022 2:58:20 PM (07-27-22 @ 12:58)  12 Lead ECG:   Ventricular Rate 96 BPM    Atrial Rate 416 BPM    QRS Duration 66 ms    Q-T Interval 356 ms    QTC Calculation(Bazett) 449 ms    R Axis 123 degrees    T Axis 2 degrees    Diagnosis Line Atrial fibrillation  Right axis deviation  Low voltage QRS      Confirmed by YA MORIN MD ([id number]) on 7/25/2022 8:54:11 AM (07-24-22 @ 12:32)      MEDICATIONS  azithromycin  IVPB 500 IV Intermittent every 24 hours  cefTRIAXone   IVPB 1000 IV Intermittent every 24 hours  dexAMETHasone  Injectable 6 IV Push daily  levETIRAcetam 500 Oral two times a day  levothyroxine 25 Oral daily  metoprolol tartrate 25 Oral every 6 hours  remdesivir  IVPB 100 IV Intermittent every 24 hours  remdesivir  IVPB  IV Intermittent   rivaroxaban 15 Oral daily      Weight  Weight (kg): 76 (07-28-22 @ 16:15)    ANTIBIOTICS:  azithromycin  IVPB 500 milliGRAM(s) IV Intermittent every 24 hours  cefTRIAXone   IVPB 1000 milliGRAM(s) IV Intermittent every 24 hours  remdesivir  IVPB 100 milliGRAM(s) IV Intermittent every 24 hours  remdesivir  IVPB   IV Intermittent       ALLERGIES:  No Known Allergies

## 2022-07-29 NOTE — PROGRESS NOTE ADULT - ASSESSMENT
88 yo female with PMHx Afib, HTN, Hypothyroid and seizures admitted with COVID PNA and Afib with RVR.        1. COVID PNA  -Continue with - Dexamethasone 6mg IV Q24,  RDV, and IV antibiotic   - ID to follow up   - Blood culture negative   -wean off oxygen as tolerated    2.Acute on Chronic  Afib with RVR  -Rate controlled on metoprolol  - cont Xarelto   -TSH level ok    3. HTN  - monitor BP  - cont  Metoprolol    4. Hypothyroid  - cont Synthroid    5. Seizures  - seizure precautions  - cont Keppra (first dose now as pt did not take this AM)    6. DVT Proph  - pt already on AC    7.  Abnormal LFT  -Abdm US shows Cholelithiasis. No biliary ductal dilatation present.  -Monitor LFT      Code status discussed with patient, all option explored.  patient opted to DNR/DNI with full understanding of what it involves in details.  son, and RN at bedside  #Progress Note Handoff  Pending (specify):  as above  Family discussion:  plan of care was discussed with patient  in details.  all questions were answered.  seems to understand, and in agreement  Disposition:  unknown

## 2022-07-30 LAB
ALBUMIN SERPL ELPH-MCNC: 3.9 G/DL — SIGNIFICANT CHANGE UP (ref 3.5–5.2)
ALBUMIN SERPL ELPH-MCNC: 4 G/DL — SIGNIFICANT CHANGE UP (ref 3.5–5.2)
ALP SERPL-CCNC: 121 U/L — HIGH (ref 30–115)
ALP SERPL-CCNC: 121 U/L — HIGH (ref 30–115)
ALT FLD-CCNC: 73 U/L — HIGH (ref 0–41)
ALT FLD-CCNC: 73 U/L — HIGH (ref 0–41)
ANION GAP SERPL CALC-SCNC: 13 MMOL/L — SIGNIFICANT CHANGE UP (ref 7–14)
AST SERPL-CCNC: 28 U/L — SIGNIFICANT CHANGE UP (ref 0–41)
AST SERPL-CCNC: 29 U/L — SIGNIFICANT CHANGE UP (ref 0–41)
BILIRUB DIRECT SERPL-MCNC: <0.2 MG/DL — SIGNIFICANT CHANGE UP (ref 0–0.3)
BILIRUB INDIRECT FLD-MCNC: >0.1 MG/DL — LOW (ref 0.2–1.2)
BILIRUB SERPL-MCNC: 0.3 MG/DL — SIGNIFICANT CHANGE UP (ref 0.2–1.2)
BILIRUB SERPL-MCNC: 0.3 MG/DL — SIGNIFICANT CHANGE UP (ref 0.2–1.2)
BUN SERPL-MCNC: 19 MG/DL — SIGNIFICANT CHANGE UP (ref 10–20)
CALCIUM SERPL-MCNC: 9.2 MG/DL — SIGNIFICANT CHANGE UP (ref 8.5–10.1)
CHLORIDE SERPL-SCNC: 96 MMOL/L — LOW (ref 98–110)
CO2 SERPL-SCNC: 27 MMOL/L — SIGNIFICANT CHANGE UP (ref 17–32)
CREAT SERPL-MCNC: 0.7 MG/DL — SIGNIFICANT CHANGE UP (ref 0.7–1.5)
CREAT SERPL-MCNC: 0.7 MG/DL — SIGNIFICANT CHANGE UP (ref 0.7–1.5)
EGFR: 84 ML/MIN/1.73M2 — SIGNIFICANT CHANGE UP
EGFR: 84 ML/MIN/1.73M2 — SIGNIFICANT CHANGE UP
GLUCOSE SERPL-MCNC: 147 MG/DL — HIGH (ref 70–99)
HCT VFR BLD CALC: 46 % — SIGNIFICANT CHANGE UP (ref 37–47)
HGB BLD-MCNC: 14.8 G/DL — SIGNIFICANT CHANGE UP (ref 12–16)
INR BLD: 1.38 RATIO — HIGH (ref 0.65–1.3)
MCHC RBC-ENTMCNC: 26.7 PG — LOW (ref 27–31)
MCHC RBC-ENTMCNC: 32.2 G/DL — SIGNIFICANT CHANGE UP (ref 32–37)
MCV RBC AUTO: 83 FL — SIGNIFICANT CHANGE UP (ref 81–99)
NRBC # BLD: 0 /100 WBCS — SIGNIFICANT CHANGE UP (ref 0–0)
PLATELET # BLD AUTO: 306 K/UL — SIGNIFICANT CHANGE UP (ref 130–400)
POTASSIUM SERPL-MCNC: 4.7 MMOL/L — SIGNIFICANT CHANGE UP (ref 3.5–5)
POTASSIUM SERPL-SCNC: 4.7 MMOL/L — SIGNIFICANT CHANGE UP (ref 3.5–5)
PROT SERPL-MCNC: 6.7 G/DL — SIGNIFICANT CHANGE UP (ref 6–8)
PROT SERPL-MCNC: 6.7 G/DL — SIGNIFICANT CHANGE UP (ref 6–8)
PROTHROM AB SERPL-ACNC: 15.8 SEC — HIGH (ref 9.95–12.87)
RBC # BLD: 5.54 M/UL — HIGH (ref 4.2–5.4)
RBC # FLD: 14.1 % — SIGNIFICANT CHANGE UP (ref 11.5–14.5)
SODIUM SERPL-SCNC: 136 MMOL/L — SIGNIFICANT CHANGE UP (ref 135–146)
WBC # BLD: 12.17 K/UL — HIGH (ref 4.8–10.8)
WBC # FLD AUTO: 12.17 K/UL — HIGH (ref 4.8–10.8)

## 2022-07-30 PROCEDURE — 99232 SBSQ HOSP IP/OBS MODERATE 35: CPT

## 2022-07-30 RX ADMIN — Medication 25 MILLIGRAM(S): at 05:42

## 2022-07-30 RX ADMIN — LEVETIRACETAM 500 MILLIGRAM(S): 250 TABLET, FILM COATED ORAL at 05:42

## 2022-07-30 RX ADMIN — Medication 600 MILLIGRAM(S): at 18:19

## 2022-07-30 RX ADMIN — Medication 25 MILLIGRAM(S): at 18:18

## 2022-07-30 RX ADMIN — LEVETIRACETAM 500 MILLIGRAM(S): 250 TABLET, FILM COATED ORAL at 18:19

## 2022-07-30 RX ADMIN — REMDESIVIR 500 MILLIGRAM(S): 5 INJECTION INTRAVENOUS at 21:11

## 2022-07-30 RX ADMIN — Medication 100 MILLIGRAM(S): at 10:49

## 2022-07-30 RX ADMIN — Medication 25 MILLIGRAM(S): at 11:26

## 2022-07-30 RX ADMIN — Medication 600 MILLIGRAM(S): at 05:45

## 2022-07-30 RX ADMIN — Medication 6 MILLIGRAM(S): at 05:42

## 2022-07-30 RX ADMIN — RIVAROXABAN 15 MILLIGRAM(S): KIT at 11:26

## 2022-07-30 RX ADMIN — Medication 25 MICROGRAM(S): at 05:42

## 2022-07-30 RX ADMIN — Medication 25 MILLIGRAM(S): at 02:35

## 2022-07-30 NOTE — PROGRESS NOTE ADULT - ASSESSMENT
86 yo female with PMHx Afib, HTN, Hypothyroid and seizures admitted with COVID PNA and Afib with RVR.        1. COVID PNA  -Continue with - Dexamethasone 6mg IV Q24,  RDV.  stop antibiotic as per ID   - ID to follow up   - Blood culture negative   -wean off oxygen as tolerated    2.Acute on Chronic  Afib with RVR  -Rate controlled on metoprolol  - cont Xarelto   -TSH level ok    3. HTN  - monitor BP  - cont  Metoprolol    4. Hypothyroid  - cont Synthroid    5. Seizures  - seizure precautions  - cont Keppra (first dose now as pt did not take this AM)    6. DVT Proph  - pt already on AC    7.  Abnormal LFT  -Abdm US shows Cholelithiasis. No biliary ductal dilatation present.  -Monitor LFT      Code status discussed with patient, all option explored.  patient opted to DNR/DNI with full understanding of what it involves in details.  son, and RN at bedside  #Progress Note Handoff  Pending (specify):  as above  Family discussion:  plan of care was discussed with patient  in details.  all questions were answered.  seems to understand, and in agreement  Disposition:  unknown

## 2022-07-30 NOTE — PHYSICAL THERAPY INITIAL EVALUATION ADULT - GENERAL OBSERVATIONS, REHAB EVAL
14 :45 -15:15 Chart reviewed. Order received.  Patient available to be seen for Pt, confirmed with RN. pt encountered  Semi reclined in the bed denies pain, and agrees to participate in session, +Tele , + RENETTA Cortes

## 2022-07-30 NOTE — PHYSICAL THERAPY INITIAL EVALUATION ADULT - PERTINENT HX OF CURRENT PROBLEM, REHAB EVAL
88yo PMHx AFib, Hypothroid, HTN, Seizures began having coughing last week. Tested on Thursday and was negative but sxs got progressively worse and on Sunday she presented to ER and was diagnosed with COVID and sent home. Pt states she got progressively worse and has not slept because of the cough so she returned to ER today.

## 2022-07-30 NOTE — PHYSICAL THERAPY INITIAL EVALUATION ADULT - ADDITIONAL COMMENTS
As per the pt she lives alone in an apartment with 3 Steps to enter the apartment with BL HR and than has Chair lift to the apartment , As per the pt she was Independent with Bed mobility , transfer, ambulation , and Basic ADLS PTA and Has some difficulty with stairs PTA

## 2022-07-30 NOTE — PHYSICAL THERAPY INITIAL EVALUATION ADULT - CRITERIA FOR SKILLED THERAPEUTIC INTERVENTIONS
Orders placed. Can you create letter encounter? impairments found/functional limitations in following categories/rehab potential/therapy frequency/predicted duration of therapy intervention/anticipated equipment needs at discharge/anticipated discharge recommendation

## 2022-07-31 LAB
ALBUMIN SERPL ELPH-MCNC: 3.8 G/DL — SIGNIFICANT CHANGE UP (ref 3.5–5.2)
ALP SERPL-CCNC: 107 U/L — SIGNIFICANT CHANGE UP (ref 30–115)
ALT FLD-CCNC: 57 U/L — HIGH (ref 0–41)
AST SERPL-CCNC: 19 U/L — SIGNIFICANT CHANGE UP (ref 0–41)
BILIRUB DIRECT SERPL-MCNC: <0.2 MG/DL — SIGNIFICANT CHANGE UP (ref 0–0.3)
BILIRUB INDIRECT FLD-MCNC: >0.1 MG/DL — LOW (ref 0.2–1.2)
BILIRUB SERPL-MCNC: 0.3 MG/DL — SIGNIFICANT CHANGE UP (ref 0.2–1.2)
CREAT SERPL-MCNC: 0.7 MG/DL — SIGNIFICANT CHANGE UP (ref 0.7–1.5)
EGFR: 84 ML/MIN/1.73M2 — SIGNIFICANT CHANGE UP
INR BLD: 1.32 RATIO — HIGH (ref 0.65–1.3)
PROT SERPL-MCNC: 6.4 G/DL — SIGNIFICANT CHANGE UP (ref 6–8)
PROTHROM AB SERPL-ACNC: 15.1 SEC — HIGH (ref 9.95–12.87)

## 2022-07-31 PROCEDURE — 71045 X-RAY EXAM CHEST 1 VIEW: CPT | Mod: 26

## 2022-07-31 PROCEDURE — 99232 SBSQ HOSP IP/OBS MODERATE 35: CPT

## 2022-07-31 RX ADMIN — Medication 25 MILLIGRAM(S): at 05:12

## 2022-07-31 RX ADMIN — LEVETIRACETAM 500 MILLIGRAM(S): 250 TABLET, FILM COATED ORAL at 17:28

## 2022-07-31 RX ADMIN — Medication 25 MILLIGRAM(S): at 11:47

## 2022-07-31 RX ADMIN — Medication 25 MICROGRAM(S): at 05:12

## 2022-07-31 RX ADMIN — Medication 25 MILLIGRAM(S): at 17:28

## 2022-07-31 RX ADMIN — Medication 600 MILLIGRAM(S): at 05:12

## 2022-07-31 RX ADMIN — LEVETIRACETAM 500 MILLIGRAM(S): 250 TABLET, FILM COATED ORAL at 05:12

## 2022-07-31 RX ADMIN — Medication 6 MILLIGRAM(S): at 05:12

## 2022-07-31 RX ADMIN — Medication 600 MILLIGRAM(S): at 17:28

## 2022-07-31 RX ADMIN — REMDESIVIR 500 MILLIGRAM(S): 5 INJECTION INTRAVENOUS at 21:07

## 2022-07-31 RX ADMIN — RIVAROXABAN 15 MILLIGRAM(S): KIT at 11:48

## 2022-07-31 NOTE — PROGRESS NOTE ADULT - ASSESSMENT
86 yo female with PMHx Afib, HTN, Hypothyroid and seizures admitted with COVID PNA and Afib with RVR.        1. COVID PNA  -Continue with - Dexamethasone 6mg IV Q24,  RDV.  stop antibiotic as per ID   - ID to follow up   - Blood culture negative   -wean off oxygen as tolerated  -Repeat CXR await official read  -Pulmonary consult    2.Acute on Chronic  Afib with RVR  -Rate controlled on metoprolol  - cont Xarelto   -TSH level ok    3. HTN  - monitor BP  - cont  Metoprolol    4. Hypothyroid  - cont Synthroid    5. Seizures  - seizure precautions  - cont Keppra (first dose now as pt did not take this AM)    6. DVT Proph  - pt already on AC    7.  Abnormal LFT  -Abdm US shows Cholelithiasis. No biliary ductal dilatation present.  -Monitor LFT      Code status discussed with patient, all option explored.  patient opted to DNR/DNI with full understanding of what it involves in details.  son, and RN at bedside  #Progress Note Handoff  Pending (specify):  as above  Family discussion:  plan of care was discussed with patient  in details.  all questions were answered.  seems to understand, and in agreement  Disposition:  home with service

## 2022-08-01 ENCOUNTER — TRANSCRIPTION ENCOUNTER (OUTPATIENT)
Age: 87
End: 2022-08-01

## 2022-08-01 LAB
ALBUMIN SERPL ELPH-MCNC: 3.4 G/DL — LOW (ref 3.5–5.2)
ALP SERPL-CCNC: 92 U/L — SIGNIFICANT CHANGE UP (ref 30–115)
ALT FLD-CCNC: 40 U/L — SIGNIFICANT CHANGE UP (ref 0–41)
ANION GAP SERPL CALC-SCNC: 7 MMOL/L — SIGNIFICANT CHANGE UP (ref 7–14)
AST SERPL-CCNC: 14 U/L — SIGNIFICANT CHANGE UP (ref 0–41)
BILIRUB SERPL-MCNC: 0.3 MG/DL — SIGNIFICANT CHANGE UP (ref 0.2–1.2)
BUN SERPL-MCNC: 21 MG/DL — HIGH (ref 10–20)
CALCIUM SERPL-MCNC: 8.9 MG/DL — SIGNIFICANT CHANGE UP (ref 8.5–10.1)
CHLORIDE SERPL-SCNC: 98 MMOL/L — SIGNIFICANT CHANGE UP (ref 98–110)
CO2 SERPL-SCNC: 32 MMOL/L — SIGNIFICANT CHANGE UP (ref 17–32)
CREAT SERPL-MCNC: 0.9 MG/DL — SIGNIFICANT CHANGE UP (ref 0.7–1.5)
CULTURE RESULTS: SIGNIFICANT CHANGE UP
CULTURE RESULTS: SIGNIFICANT CHANGE UP
EGFR: 62 ML/MIN/1.73M2 — SIGNIFICANT CHANGE UP
GLUCOSE SERPL-MCNC: 101 MG/DL — HIGH (ref 70–99)
HCT VFR BLD CALC: 45.2 % — SIGNIFICANT CHANGE UP (ref 37–47)
HGB BLD-MCNC: 14.9 G/DL — SIGNIFICANT CHANGE UP (ref 12–16)
INR BLD: 1.38 RATIO — HIGH (ref 0.65–1.3)
MCHC RBC-ENTMCNC: 26.9 PG — LOW (ref 27–31)
MCHC RBC-ENTMCNC: 33 G/DL — SIGNIFICANT CHANGE UP (ref 32–37)
MCV RBC AUTO: 81.6 FL — SIGNIFICANT CHANGE UP (ref 81–99)
NRBC # BLD: 0 /100 WBCS — SIGNIFICANT CHANGE UP (ref 0–0)
PLATELET # BLD AUTO: 269 K/UL — SIGNIFICANT CHANGE UP (ref 130–400)
POTASSIUM SERPL-MCNC: 5.5 MMOL/L — HIGH (ref 3.5–5)
POTASSIUM SERPL-SCNC: 5.5 MMOL/L — HIGH (ref 3.5–5)
PROT SERPL-MCNC: 5.7 G/DL — LOW (ref 6–8)
PROTHROM AB SERPL-ACNC: 15.8 SEC — HIGH (ref 9.95–12.87)
RBC # BLD: 5.54 M/UL — HIGH (ref 4.2–5.4)
RBC # FLD: 13.8 % — SIGNIFICANT CHANGE UP (ref 11.5–14.5)
SODIUM SERPL-SCNC: 137 MMOL/L — SIGNIFICANT CHANGE UP (ref 135–146)
SPECIMEN SOURCE: SIGNIFICANT CHANGE UP
SPECIMEN SOURCE: SIGNIFICANT CHANGE UP
WBC # BLD: 9.28 K/UL — SIGNIFICANT CHANGE UP (ref 4.8–10.8)
WBC # FLD AUTO: 9.28 K/UL — SIGNIFICANT CHANGE UP (ref 4.8–10.8)

## 2022-08-01 PROCEDURE — 99232 SBSQ HOSP IP/OBS MODERATE 35: CPT

## 2022-08-01 RX ORDER — METOPROLOL TARTRATE 50 MG
1 TABLET ORAL
Qty: 0 | Refills: 0 | DISCHARGE
Start: 2022-08-01

## 2022-08-01 RX ORDER — SODIUM ZIRCONIUM CYCLOSILICATE 10 G/10G
10 POWDER, FOR SUSPENSION ORAL ONCE
Refills: 0 | Status: COMPLETED | OUTPATIENT
Start: 2022-08-01 | End: 2022-08-01

## 2022-08-01 RX ADMIN — LEVETIRACETAM 500 MILLIGRAM(S): 250 TABLET, FILM COATED ORAL at 18:27

## 2022-08-01 RX ADMIN — SODIUM ZIRCONIUM CYCLOSILICATE 10 GRAM(S): 10 POWDER, FOR SUSPENSION ORAL at 14:55

## 2022-08-01 RX ADMIN — RIVAROXABAN 15 MILLIGRAM(S): KIT at 11:36

## 2022-08-01 RX ADMIN — Medication 25 MICROGRAM(S): at 05:32

## 2022-08-01 RX ADMIN — Medication 100 MILLIGRAM(S): at 22:08

## 2022-08-01 RX ADMIN — Medication 100 MILLIGRAM(S): at 05:32

## 2022-08-01 RX ADMIN — Medication 25 MILLIGRAM(S): at 18:27

## 2022-08-01 RX ADMIN — Medication 600 MILLIGRAM(S): at 18:27

## 2022-08-01 RX ADMIN — Medication 600 MILLIGRAM(S): at 05:32

## 2022-08-01 RX ADMIN — Medication 25 MILLIGRAM(S): at 11:36

## 2022-08-01 RX ADMIN — Medication 25 MILLIGRAM(S): at 05:32

## 2022-08-01 RX ADMIN — LEVETIRACETAM 500 MILLIGRAM(S): 250 TABLET, FILM COATED ORAL at 05:32

## 2022-08-01 RX ADMIN — Medication 6 MILLIGRAM(S): at 05:33

## 2022-08-01 NOTE — DISCHARGE NOTE PROVIDER - HOSPITAL COURSE
86yo PMHx AFib, Hypothroid, HTN, Seizures began having coughing last week. Tested on Thursday and was negative but sxs got progressively worse and on Sunday she presented to ER and was diagnosed with COVID and sent home. Pt states she got progressively worse and has not slept because of the cough so she returned to ER today. Pt denies any CP, palpitations, abdominal pain, dysuria. Pt has no known sick contacts. Pt admits to vaccine x 3 - last dose unknown.     Patient admitted to medicine service, started on Dexamethasone, RDV and IV ABX as well as given supplemental o2.  ID consulted and followed.  Patient's inflammatory markers trended and improved, Procal negative and ABX discontinued.  Patient symptomatically improved and patient weaned off o2 back to RA.  Initially AFIB w/RVR noted, Metoprolol dose increased and rate control achieved.  Patient will need outpatient Cards FU. 86yo PMHx AFib, Hypothroid, HTN, Seizures began having coughing last week. Tested on Thursday and was negative but sxs got progressively worse and on Sunday she presented to ER and was diagnosed with COVID and sent home. Pt states she got progressively worse and has not slept because of the cough so she returned to ER today. Pt denies any CP, palpitations, abdominal pain, dysuria. Pt has no known sick contacts. Pt admits to vaccine x 3 - last dose unknown.     Patient admitted to medicine service, started on Dexamethasone, RDV and IV ABX as well as given supplemental o2.  ID consulted and followed.  Patient's inflammatory markers trended and improved, Procal negative and ABX discontinued.  Patient symptomatically improved and patient weaned off o2 back to RA.  Initially AFIB w/RVR noted, Metoprolol dose increased and rate control achieved.  Patient will need outpatient Cards FU.      Patient was seen and examined today  feels better  SPO2 is 94% on Ra with ambulation  Offers no other complaints   Constitutional: No fever, fatigue or weight loss.  Skin: No rash.  Eyes: No recent vision problems or eye pain.  ENT: No congestion, ear pain, or sore throat.  Endocrine: No thyroid problems.  Cardiovascular: No chest pain or palpation.  Respiratory: No cough, shortness of breath, congestion, or wheezing.  Gastrointestinal: No abdominal pain, nausea, vomiting, or diarrhea.  Genitourinary: No dysuria.  Musculoskeletal: No joint swelling.  Neurologic: No headache.  Vital Signs Last 24 Hrs  T(C): 36.4 (08-02-22 @ 13:35), Max: 36.8 (08-01-22 @ 21:00)  T(F): 97.6 (08-02-22 @ 13:35), Max: 98.3 (08-01-22 @ 21:00)  HR: 71 (08-02-22 @ 13:35) (71 - 84)  BP: 124/81 (08-02-22 @ 13:35) (124/81 - 150/70)  BP(mean): --  RR: 16 (08-02-22 @ 05:33) (16 - 16)  SpO2: 93% (08-02-22 @ 11:55) (82% - 96%)  PHYSICAL EXAM-  GENERAL: NAD,    HEAD:  Atraumatic, Normocephalic  EYES: EOMI, PERRLA, conjunctiva and sclera clear  NECK: Supple, No JVD, Normal thyroid  NERVOUS SYSTEM:  Alert & Oriented X3, Moving all extremities  CHEST/LUNG: Clear to percussion bilaterally; No rales, rhonchi, wheezing, or rubs  HEART: Regular rate and rhythm; No murmurs, rubs, or gallops  ABDOMEN: Soft, Nontender, Nondistended; Bowel sounds present  EXTREMITIES:   No clubbing, cyanosis, or edema  SKIN: No rashes or lesions  refused rehab placement, prefer home with services.  all risks, benefits, and alternatives discussed  Hospital course, and discharge planning were discussed with patient,   in details.  all questions were answered.  seems to understand, and in agreement.  time 70 min                                    14.9   10.75 )-----------( 286      ( 02 Aug 2022 06:46 )             45.6     08-02    137  |  98  |  17  ----------------------------<  105<H>  4.9   |  30  |  0.8    Ca    8.7      02 Aug 2022 06:46    TPro  6.0  /  Alb  3.6  /  TBili  0.4  /  DBili  x   /  AST  13  /  ALT  33  /  AlkPhos  93  08-02            PT/INR - ( 02 Aug 2022 06:46 )   PT: 15.20 sec;   INR: 1.33 ratio                 MEDICATIONS  (STANDING):  dexAMETHasone  Injectable 6 milliGRAM(s) IV Push daily  guaiFENesin  milliGRAM(s) Oral every 12 hours  levETIRAcetam 500 milliGRAM(s) Oral two times a day  levothyroxine 25 MICROGram(s) Oral daily  metoprolol tartrate 25 milliGRAM(s) Oral every 6 hours  rivaroxaban 15 milliGRAM(s) Oral daily    MEDICATIONS  (PRN):  acetaminophen     Tablet .. 650 milliGRAM(s) Oral every 4 hours PRN Temp greater or equal to 38.5C (101.3F)  acetaminophen  Suppository .. 650 milliGRAM(s) Rectal every 4 hours PRN Temp greater or equal to 38.5C (101.3F)  benzonatate 100 milliGRAM(s) Oral every 8 hours PRN Cough  hydrOXYzine hydrochloride 25 milliGRAM(s) Oral at bedtime PRN Restlessness          RADIOLOGY RESULTS:

## 2022-08-01 NOTE — DISCHARGE NOTE PROVIDER - CARE PROVIDERS DIRECT ADDRESSES
,DirectAddress_Unknown,[email or web address]

## 2022-08-01 NOTE — DISCHARGE NOTE PROVIDER - NSDCFUSCHEDAPPT_GEN_ALL_CORE_FT
Bob Lynn  [facility] Physician [facility]  PULMMED [address]  Scheduled Appointment: 08/30/2022    Valeriy Arcos  [facility] Physician [facility]  Cardio [address]  Scheduled Appointment: 09/12/2022

## 2022-08-01 NOTE — DISCHARGE NOTE PROVIDER - NSDCCPCAREPLAN_GEN_ALL_CORE_FT
PRINCIPAL DISCHARGE DIAGNOSIS  Diagnosis: Pneumonia due to COVID-19 virus  Assessment and Plan of Treatment: You were treated with Steroids and an antiviral and improved.  Make sure to FU with your PCP on discharge.      SECONDARY DISCHARGE DIAGNOSES  Diagnosis: Tachycardia  Assessment and Plan of Treatment: Resolved, your dose of metoprololw as changed.  Make sure to FU outpatient with your Cardiologist    Diagnosis: Afib  Assessment and Plan of Treatment: As above

## 2022-08-01 NOTE — PROGRESS NOTE ADULT - SUBJECTIVE AND OBJECTIVE BOX
CC.  COVID19  HPI.  Patient reports SOB, and cough are improving  afebrile  Offers no other complaints             Constitutional: No fever, fatigue or weight loss.  Skin: No rash.  Eyes: No recent vision problems or eye pain.  ENT: No congestion, ear pain, or sore throat.  Endocrine: No thyroid problems.  Cardiovascular: No chest pain or palpation.  Respiratory: No   congestion, or wheezing.  Gastrointestinal: No abdominal pain, nausea, vomiting, or diarrhea.  Genitourinary: No dysuria.  Musculoskeletal: No joint swelling.  Neurologic: No headache.      Vital Signs Last 24 Hrs  T(C): 36.7 (31 Jul 2022 05:52), Max: 36.7 (31 Jul 2022 05:52)  T(F): 98 (31 Jul 2022 05:52), Max: 98 (31 Jul 2022 05:52)  HR: 77 (31 Jul 2022 05:52) (77 - 99)  BP: 160/76 (31 Jul 2022 05:52) (137/77 - 160/76)  BP(mean): --  RR: 16 (31 Jul 2022 05:52) (16 - 16)  SpO2: 94% (30 Jul 2022 18:23) (94% - 94%)    Parameters below as of 30 Jul 2022 18:23  Patient On (Oxygen Delivery Method): nasal cannula  O2 Flow (L/min): 3        PHYSICAL EXAM-  GENERAL: NAD,    HEAD:  Atraumatic, Normocephalic  EYES: EOMI, PERRLA, conjunctiva and sclera clear  NECK: Supple, No JVD, Normal thyroid  NERVOUS SYSTEM:  Alert & Oriented X3, Moving all extremities  CHEST/LUNG: + rhonchi with good air entry  HEART: Regular rate and rhythm; No murmurs, rubs, or gallops  ABDOMEN: Soft, Nontender, Nondistended; Bowel sounds present  EXTREMITIES:   No clubbing, cyanosis, or edema  SKIN: No rashes or lesions                              14.8   12.17 )-----------( 306      ( 30 Jul 2022 08:39 )             46.0     07-31    x   |  x   |  x   ----------------------------<  x   x    |  x   |  0.7    Ca    9.2      30 Jul 2022 08:39    TPro  6.4  /  Alb  3.8  /  TBili  0.3  /  DBili  <0.2  /  AST  19  /  ALT  57<H>  /  AlkPhos  107  07-31            PT/INR - ( 31 Jul 2022 08:30 )   PT: 15.10 sec;   INR: 1.32 ratio                                  MEDICATIONS  (STANDING):  dexAMETHasone  Injectable 6 milliGRAM(s) IV Push daily  guaiFENesin  milliGRAM(s) Oral every 12 hours  levETIRAcetam 500 milliGRAM(s) Oral two times a day  levothyroxine 25 MICROGram(s) Oral daily  metoprolol tartrate 25 milliGRAM(s) Oral every 6 hours  remdesivir  IVPB 100 milliGRAM(s) IV Intermittent every 24 hours  remdesivir  IVPB   IV Intermittent   rivaroxaban 15 milliGRAM(s) Oral daily    MEDICATIONS  (PRN):  acetaminophen     Tablet .. 650 milliGRAM(s) Oral every 4 hours PRN Temp greater or equal to 38.5C (101.3F)  acetaminophen  Suppository .. 650 milliGRAM(s) Rectal every 4 hours PRN Temp greater or equal to 38.5C (101.3F)  benzonatate 100 milliGRAM(s) Oral every 8 hours PRN Cough  hydrOXYzine hydrochloride 25 milliGRAM(s) Oral at bedtime PRN Restlessness             Imaging Personally Reviewed:     [x ] YES  [ ] NO    Consultant(s) Notes Reviewed:  [x ] YES  [ ] NO    Care Discussed with Consultants/Other Providers [x ] YES  [ ] No medical contraindication for discharge  
CC.  COVID19  HPI.  Patient reports SOB, and cough are improving  afebrile  Offers no other complaints             Constitutional: No fever, fatigue or weight loss.  Skin: No rash.  Eyes: No recent vision problems or eye pain.  ENT: No congestion, ear pain, or sore throat.  Endocrine: No thyroid problems.  Cardiovascular: No chest pain or palpation.  Respiratory: No   congestion, or wheezing.  Gastrointestinal: No abdominal pain, nausea, vomiting, or diarrhea.  Genitourinary: No dysuria.  Musculoskeletal: No joint swelling.  Neurologic: No headache.      Vital Signs Last 24 Hrs  T(C): 36.8 (07-28-22 @ 06:20), Max: 36.8 (07-28-22 @ 06:20)  T(F): 98.2 (07-28-22 @ 06:20), Max: 98.2 (07-28-22 @ 06:20)  HR: 108 (07-28-22 @ 06:20) (108 - 112)  BP: 142/88 (07-28-22 @ 06:20) (118/67 - 142/88)  BP(mean): --  RR: 18 (07-28-22 @ 06:20) (18 - 20)  SpO2: 96% (07-28-22 @ 06:20) (96% - 98%)        PHYSICAL EXAM-  GENERAL: NAD,    HEAD:  Atraumatic, Normocephalic  EYES: EOMI, PERRLA, conjunctiva and sclera clear  NECK: Supple, No JVD, Normal thyroid  NERVOUS SYSTEM:  Alert & Oriented X3, Moving all extremities  CHEST/LUNG: + rhonchi with good air entry  HEART: Regular rate and rhythm; No murmurs, rubs, or gallops  ABDOMEN: Soft, Nontender, Nondistended; Bowel sounds present  EXTREMITIES:   No clubbing, cyanosis, or edema  SKIN: No rashes or lesions                                  13.6   8.15  )-----------( 227      ( 28 Jul 2022 06:35 )             41.3     07-28    x   |  x   |  x   ----------------------------<  x   x    |  x   |  0.6<L>    Ca    8.8      28 Jul 2022 06:35  Mg     2.0     07-28    TPro  6.5  /  Alb  3.7  /  TBili  0.3  /  DBili  <0.2  /  AST  42<H>  /  ALT  70<H>  /  AlkPhos  125<H>  07-28    CARDIAC MARKERS ( 27 Jul 2022 12:15 )  x     / <0.01 ng/mL / x     / x     / x              PT/INR - ( 28 Jul 2022 08:46 )   PT: 18.10 sec;   INR: 1.58 ratio         PTT - ( 27 Jul 2022 13:20 )  PTT:30.7 sec        MEDICATIONS  (STANDING):  azithromycin  IVPB 500 milliGRAM(s) IV Intermittent every 24 hours  cefTRIAXone   IVPB 1000 milliGRAM(s) IV Intermittent every 24 hours  dexAMETHasone  Injectable 6 milliGRAM(s) IV Push daily  levETIRAcetam 500 milliGRAM(s) Oral two times a day  levothyroxine 25 MICROGram(s) Oral daily  metoprolol tartrate 25 milliGRAM(s) Oral every 6 hours  remdesivir  IVPB 100 milliGRAM(s) IV Intermittent every 24 hours  remdesivir  IVPB   IV Intermittent   rivaroxaban 15 milliGRAM(s) Oral daily    MEDICATIONS  (PRN):  acetaminophen     Tablet .. 650 milliGRAM(s) Oral every 4 hours PRN Temp greater or equal to 38.5C (101.3F)  acetaminophen  Suppository .. 650 milliGRAM(s) Rectal every 4 hours PRN Temp greater or equal to 38.5C (101.3F)  benzonatate 100 milliGRAM(s) Oral every 8 hours PRN Cough  hydrOXYzine hydrochloride 25 milliGRAM(s) Oral at bedtime PRN Restlessness        Imaging Personally Reviewed:     [x ] YES  [ ] NO    Consultant(s) Notes Reviewed:  [x ] YES  [ ] NO    Care Discussed with Consultants/Other Providers [x ] YES  [ ] No medical contraindication for discharge  
CC.  COVID19  HPI.  Patient reports SOB, and cough are improving  afebrile  Offers no other complaints             Constitutional: No fever, fatigue or weight loss.  Skin: No rash.  Eyes: No recent vision problems or eye pain.  ENT: No congestion, ear pain, or sore throat.  Endocrine: No thyroid problems.  Cardiovascular: No chest pain or palpation.  Respiratory: No   congestion, or wheezing.  Gastrointestinal: No abdominal pain, nausea, vomiting, or diarrhea.  Genitourinary: No dysuria.  Musculoskeletal: No joint swelling.  Neurologic: No headache.      Vital Signs Last 24 Hrs  T(C): 36.1 (29 Jul 2022 05:00), Max: 36.7 (28 Jul 2022 21:15)  T(F): 97 (29 Jul 2022 05:00), Max: 98 (28 Jul 2022 21:15)  HR: 71 (29 Jul 2022 05:00) (71 - 110)  BP: 135/75 (29 Jul 2022 05:00) (132/78 - 153/83)  BP(mean): --  RR: 18 (29 Jul 2022 05:00) (18 - 18)  SpO2: 88% (29 Jul 2022 07:50) (88% - 97%)    Parameters below as of 29 Jul 2022 07:50  Patient On (Oxygen Delivery Method): nasal cannula  O2 Flow (L/min): 3          PHYSICAL EXAM-  GENERAL: NAD,    HEAD:  Atraumatic, Normocephalic  EYES: EOMI, PERRLA, conjunctiva and sclera clear  NECK: Supple, No JVD, Normal thyroid  NERVOUS SYSTEM:  Alert & Oriented X3, Moving all extremities  CHEST/LUNG: + rhonchi with good air entry  HEART: Regular rate and rhythm; No murmurs, rubs, or gallops  ABDOMEN: Soft, Nontender, Nondistended; Bowel sounds present  EXTREMITIES:   No clubbing, cyanosis, or edema  SKIN: No rashes or lesions                                 13.1   8.68  )-----------( 220      ( 29 Jul 2022 07:45 )             39.8     07-29    137  |  101  |  18  ----------------------------<  146<H>  4.7   |  26  |  0.5<L>    Ca    8.7      29 Jul 2022 07:45  Mg     2.0     07-28    TPro  6.1  /  Alb  3.6  /  TBili  0.2  /  DBili  x   /  AST  37  /  ALT  72<H>  /  AlkPhos  117<H>  07-29    CARDIAC MARKERS ( 27 Jul 2022 12:15 )  x     / <0.01 ng/mL / x     / x     / x              PT/INR - ( 29 Jul 2022 07:45 )   PT: 16.10 sec;   INR: 1.40 ratio         PTT - ( 27 Jul 2022 13:20 )  PTT:30.7 sec        MEDICATIONS  (STANDING):  azithromycin  IVPB 500 milliGRAM(s) IV Intermittent every 24 hours  cefTRIAXone   IVPB 1000 milliGRAM(s) IV Intermittent every 24 hours  dexAMETHasone  Injectable 6 milliGRAM(s) IV Push daily  levETIRAcetam 500 milliGRAM(s) Oral two times a day  levothyroxine 25 MICROGram(s) Oral daily  metoprolol tartrate 25 milliGRAM(s) Oral every 6 hours  remdesivir  IVPB 100 milliGRAM(s) IV Intermittent every 24 hours  remdesivir  IVPB   IV Intermittent   rivaroxaban 15 milliGRAM(s) Oral daily    MEDICATIONS  (PRN):  acetaminophen     Tablet .. 650 milliGRAM(s) Oral every 4 hours PRN Temp greater or equal to 38.5C (101.3F)  acetaminophen  Suppository .. 650 milliGRAM(s) Rectal every 4 hours PRN Temp greater or equal to 38.5C (101.3F)  benzonatate 100 milliGRAM(s) Oral every 8 hours PRN Cough  hydrOXYzine hydrochloride 25 milliGRAM(s) Oral at bedtime PRN Restlessness        Imaging Personally Reviewed:     [x ] YES  [ ] NO    Consultant(s) Notes Reviewed:  [x ] YES  [ ] NO    Care Discussed with Consultants/Other Providers [x ] YES  [ ] No medical contraindication for discharge  
CC.  COVID19  HPI.  Patient reports SOB, and cough are improving  afebrile  Offers no other complaints             Constitutional: No fever, fatigue or weight loss.  Skin: No rash.  Eyes: No recent vision problems or eye pain.  ENT: No congestion, ear pain, or sore throat.  Endocrine: No thyroid problems.  Cardiovascular: No chest pain or palpation.  Respiratory: No   congestion, or wheezing.  Gastrointestinal: No abdominal pain, nausea, vomiting, or diarrhea.  Genitourinary: No dysuria.  Musculoskeletal: No joint swelling.  Neurologic: No headache.      Vital Signs Last 24 Hrs  T(C): 35.8 (01 Aug 2022 13:52), Max: 36.2 (01 Aug 2022 05:33)  T(F): 96.5 (01 Aug 2022 13:52), Max: 97.1 (01 Aug 2022 05:33)  HR: 84 (01 Aug 2022 13:52) (82 - 86)  BP: 129/85 (01 Aug 2022 13:52) (129/85 - 151/89)  BP(mean): --  RR: 16 (31 Jul 2022 21:00) (16 - 16)  SpO2: 96% (01 Aug 2022 05:33) (95% - 96%)    Parameters below as of 01 Aug 2022 05:33  Patient On (Oxygen Delivery Method): nasal cannula            PHYSICAL EXAM-  GENERAL: NAD,    HEAD:  Atraumatic, Normocephalic  EYES: EOMI, PERRLA, conjunctiva and sclera clear  NECK: Supple, No JVD, Normal thyroid  NERVOUS SYSTEM:  Alert & Oriented X3, Moving all extremities  CHEST/LUNG: + rhonchi with good air entry  HEART: Regular rate and rhythm; No murmurs, rubs, or gallops  ABDOMEN: Soft, Nontender, Nondistended; Bowel sounds present  EXTREMITIES:   No clubbing, cyanosis, or edema  SKIN: No rashes or lesions                                               14.9   9.28  )-----------( 269      ( 01 Aug 2022 07:32 )             45.2     08-01    137  |  98  |  21<H>  ----------------------------<  101<H>  5.5<H>   |  32  |  0.9    Ca    8.9      01 Aug 2022 07:32    TPro  5.7<L>  /  Alb  3.4<L>  /  TBili  0.3  /  DBili  x   /  AST  14  /  ALT  40  /  AlkPhos  92  08-01            PT/INR - ( 01 Aug 2022 07:32 )   PT: 15.80 sec;   INR: 1.38 ratio                                               MEDICATIONS  (STANDING):  dexAMETHasone  Injectable 6 milliGRAM(s) IV Push daily  guaiFENesin  milliGRAM(s) Oral every 12 hours  levETIRAcetam 500 milliGRAM(s) Oral two times a day  levothyroxine 25 MICROGram(s) Oral daily  metoprolol tartrate 25 milliGRAM(s) Oral every 6 hours  remdesivir  IVPB 100 milliGRAM(s) IV Intermittent every 24 hours  remdesivir  IVPB   IV Intermittent   rivaroxaban 15 milliGRAM(s) Oral daily    MEDICATIONS  (PRN):  acetaminophen     Tablet .. 650 milliGRAM(s) Oral every 4 hours PRN Temp greater or equal to 38.5C (101.3F)  acetaminophen  Suppository .. 650 milliGRAM(s) Rectal every 4 hours PRN Temp greater or equal to 38.5C (101.3F)  benzonatate 100 milliGRAM(s) Oral every 8 hours PRN Cough  hydrOXYzine hydrochloride 25 milliGRAM(s) Oral at bedtime PRN Restlessness             Imaging Personally Reviewed:     [x ] YES  [ ] NO    Consultant(s) Notes Reviewed:  [x ] YES  [ ] NO    Care Discussed with Consultants/Other Providers [x ] YES  [ ] No medical contraindication for discharge  
CC.  COVID19  HPI.  Patient reports SOB, and cough are improving  afebrile  Offers no other complaints             Constitutional: No fever, fatigue or weight loss.  Skin: No rash.  Eyes: No recent vision problems or eye pain.  ENT: No congestion, ear pain, or sore throat.  Endocrine: No thyroid problems.  Cardiovascular: No chest pain or palpation.  Respiratory: No   congestion, or wheezing.  Gastrointestinal: No abdominal pain, nausea, vomiting, or diarrhea.  Genitourinary: No dysuria.  Musculoskeletal: No joint swelling.  Neurologic: No headache.      Vital Signs Last 24 Hrs  T(C): 36.1 (30 Jul 2022 05:44), Max: 36.3 (29 Jul 2022 20:50)  T(F): 97 (30 Jul 2022 05:44), Max: 97.3 (29 Jul 2022 20:50)  HR: 80 (30 Jul 2022 11:29) (78 - 114)  BP: 157/73 (30 Jul 2022 11:29) (129/85 - 160/86)  BP(mean): --  RR: 16 (29 Jul 2022 13:23) (16 - 16)  SpO2: 93% (30 Jul 2022 08:38) (93% - 93%)    Parameters below as of 30 Jul 2022 08:38  Patient On (Oxygen Delivery Method): nasal cannula  O2 Flow (L/min): 3            PHYSICAL EXAM-  GENERAL: NAD,    HEAD:  Atraumatic, Normocephalic  EYES: EOMI, PERRLA, conjunctiva and sclera clear  NECK: Supple, No JVD, Normal thyroid  NERVOUS SYSTEM:  Alert & Oriented X3, Moving all extremities  CHEST/LUNG: + rhonchi with good air entry  HEART: Regular rate and rhythm; No murmurs, rubs, or gallops  ABDOMEN: Soft, Nontender, Nondistended; Bowel sounds present  EXTREMITIES:   No clubbing, cyanosis, or edema  SKIN: No rashes or lesions                              14.8   12.17 )-----------( 306      ( 30 Jul 2022 08:39 )             46.0     07-30    136  |  96<L>  |  19  ----------------------------<  147<H>  4.7   |  27  |  0.7    Ca    9.2      30 Jul 2022 08:39    TPro  6.7  /  Alb  4.0  /  TBili  0.3  /  DBili  <0.2  /  AST  28  /  ALT  73<H>  /  AlkPhos  121<H>  07-30            PT/INR - ( 30 Jul 2022 08:39 )   PT: 15.80 sec;   INR: 1.38 ratio       MEDICATIONS  (STANDING):  dexAMETHasone  Injectable 6 milliGRAM(s) IV Push daily  guaiFENesin  milliGRAM(s) Oral every 12 hours  levETIRAcetam 500 milliGRAM(s) Oral two times a day  levothyroxine 25 MICROGram(s) Oral daily  metoprolol tartrate 25 milliGRAM(s) Oral every 6 hours  remdesivir  IVPB 100 milliGRAM(s) IV Intermittent every 24 hours  remdesivir  IVPB   IV Intermittent   rivaroxaban 15 milliGRAM(s) Oral daily    MEDICATIONS  (PRN):  acetaminophen     Tablet .. 650 milliGRAM(s) Oral every 4 hours PRN Temp greater or equal to 38.5C (101.3F)  acetaminophen  Suppository .. 650 milliGRAM(s) Rectal every 4 hours PRN Temp greater or equal to 38.5C (101.3F)  benzonatate 100 milliGRAM(s) Oral every 8 hours PRN Cough  hydrOXYzine hydrochloride 25 milliGRAM(s) Oral at bedtime PRN Restlessness             Imaging Personally Reviewed:     [x ] YES  [ ] NO    Consultant(s) Notes Reviewed:  [x ] YES  [ ] NO    Care Discussed with Consultants/Other Providers [x ] YES  [ ] No medical contraindication for discharge

## 2022-08-01 NOTE — PROGRESS NOTE ADULT - ASSESSMENT
86 yo female with PMHx Afib, HTN, Hypothyroid and seizures admitted with COVID PNA and Afib with RVR.        1. COVID PNA  -Continue with - Dexamethasone 6mg IV Q24,  RDV.  stop antibiotic as per ID   - ID to follow up   - Blood culture negative   -wean off oxygen as tolerated  -Repeat CXR stable  -Pulmonary consult    2.Acute on Chronic  Afib with RVR  -Rate controlled on metoprolol  - cont Xarelto   -TSH level ok    3. HTN  - monitor BP  - cont  Metoprolol    4. Hypothyroid  - cont Synthroid    5. Seizures  - seizure precautions  - cont Keppra (first dose now as pt did not take this AM)    6. DVT Proph  - pt already on AC    7.  Abnormal LFT  -Abdm US shows Cholelithiasis. No biliary ductal dilatation present.  -Monitor LFT      Code status discussed with patient, all option explored.  patient opted to DNR/DNI with full understanding of what it involves in details.  son, and RN at bedside  #Progress Note Handoff  Pending (specify):  as above  Family discussion:  plan of care was discussed with patient  in details.  all questions were answered.  seems to understand, and in agreement  Disposition:  home with service

## 2022-08-01 NOTE — DISCHARGE NOTE PROVIDER - NSDCMRMEDTOKEN_GEN_ALL_CORE_FT
amLODIPine 5 mg oral tablet: 1 tab(s) orally once a day  levETIRAcetam 500 mg oral tablet: 1 tab(s) orally 2 times a day  levothyroxine 25 mcg (0.025 mg) oral capsule: 1 cap(s) orally once a day  metoprolol succinate 25 mg oral tablet, extended release: 1 tab(s) orally once a day  metoprolol tartrate 25 mg oral tablet: 1 tab(s) orally every 6 hours  Xarelto 15 mg oral tablet: 1 tab(s) orally once a day (in the evening)   amLODIPine 5 mg oral tablet: 1 tab(s) orally once a day  levETIRAcetam 500 mg oral tablet: 1 tab(s) orally 2 times a day  levothyroxine 25 mcg (0.025 mg) oral capsule: 1 cap(s) orally once a day  metoprolol succinate 25 mg oral tablet, extended release: 1 tab(s) orally once a day  metoprolol tartrate 25 mg oral tablet: 1 tab(s) orally every 6 hours  predniSONE 20 mg oral tablet: 3 tab(s) orally 2 times a day x 2 days  2 tab(s) orally 2 times a day x 2 days  1 tab(s) orally 2 times a day x 2 days  Xarelto 15 mg oral tablet: 1 tab(s) orally once a day (in the evening)   benzonatate 100 mg oral capsule: 1 cap(s) orally every 8 hours, As needed, Cough  doxycycline hyclate 100 mg oral tablet: 1 tab(s) orally 2 times a day   levETIRAcetam 500 mg oral tablet: 1 tab(s) orally 2 times a day  levothyroxine 25 mcg (0.025 mg) oral capsule: 1 cap(s) orally once a day  metoprolol tartrate 50 mg oral tablet: 1 tab(s) orally 2 times a day   predniSONE 10 mg oral tablet: 3 tab(s) orally once a day x 3 days  2 tab(s) orally once a day x 3 days  1 tab(s) orally once a day x 3 days  Xarelto 15 mg oral tablet: 1 tab(s) orally once a day (in the evening)   albuterol 90 mcg/inh inhalation powder: 2 puff(s) inhaled every 6 hours   benzonatate 100 mg oral capsule: 1 cap(s) orally every 8 hours, As needed, Cough  doxycycline hyclate 100 mg oral tablet: 1 tab(s) orally 2 times a day   levETIRAcetam 500 mg oral tablet: 1 tab(s) orally 2 times a day  levothyroxine 25 mcg (0.025 mg) oral capsule: 1 cap(s) orally once a day  metoprolol tartrate 50 mg oral tablet: 1 tab(s) orally 2 times a day   predniSONE 10 mg oral tablet: 3 tab(s) orally once a day x 3 days  2 tab(s) orally once a day x 3 days  1 tab(s) orally once a day x 3 days  Symbicort 80 mcg-4.5 mcg/inh inhalation aerosol: 2 puff(s) inhaled 2 times a day   Xarelto 15 mg oral tablet: 1 tab(s) orally once a day (in the evening)

## 2022-08-01 NOTE — DISCHARGE NOTE PROVIDER - CARE PROVIDER_API CALL
CB VAZQUEZ  Internal Medicine  [address] [place] [address]  Phone: [phone]  Fax: [phone]  Follow Up Time:     Abimael Reyes)  Cardiovascular Disease; Internal Medicine  [address] [place] [address]  Phone: [phone]  Fax: [phone]  Follow Up Time:

## 2022-08-02 ENCOUNTER — TRANSCRIPTION ENCOUNTER (OUTPATIENT)
Age: 87
End: 2022-08-02

## 2022-08-02 VITALS — SYSTOLIC BLOOD PRESSURE: 124 MMHG | HEART RATE: 71 BPM | TEMPERATURE: 98 F | DIASTOLIC BLOOD PRESSURE: 81 MMHG

## 2022-08-02 LAB
ALBUMIN SERPL ELPH-MCNC: 3.6 G/DL — SIGNIFICANT CHANGE UP (ref 3.5–5.2)
ALP SERPL-CCNC: 93 U/L — SIGNIFICANT CHANGE UP (ref 30–115)
ALT FLD-CCNC: 33 U/L — SIGNIFICANT CHANGE UP (ref 0–41)
ANION GAP SERPL CALC-SCNC: 9 MMOL/L — SIGNIFICANT CHANGE UP (ref 7–14)
AST SERPL-CCNC: 13 U/L — SIGNIFICANT CHANGE UP (ref 0–41)
BILIRUB SERPL-MCNC: 0.4 MG/DL — SIGNIFICANT CHANGE UP (ref 0.2–1.2)
BUN SERPL-MCNC: 17 MG/DL — SIGNIFICANT CHANGE UP (ref 10–20)
CALCIUM SERPL-MCNC: 8.7 MG/DL — SIGNIFICANT CHANGE UP (ref 8.5–10.1)
CHLORIDE SERPL-SCNC: 98 MMOL/L — SIGNIFICANT CHANGE UP (ref 98–110)
CO2 SERPL-SCNC: 30 MMOL/L — SIGNIFICANT CHANGE UP (ref 17–32)
CREAT SERPL-MCNC: 0.8 MG/DL — SIGNIFICANT CHANGE UP (ref 0.7–1.5)
EGFR: 71 ML/MIN/1.73M2 — SIGNIFICANT CHANGE UP
GLUCOSE SERPL-MCNC: 105 MG/DL — HIGH (ref 70–99)
HCT VFR BLD CALC: 45.6 % — SIGNIFICANT CHANGE UP (ref 37–47)
HGB BLD-MCNC: 14.9 G/DL — SIGNIFICANT CHANGE UP (ref 12–16)
INR BLD: 1.33 RATIO — HIGH (ref 0.65–1.3)
MCHC RBC-ENTMCNC: 26.9 PG — LOW (ref 27–31)
MCHC RBC-ENTMCNC: 32.7 G/DL — SIGNIFICANT CHANGE UP (ref 32–37)
MCV RBC AUTO: 82.5 FL — SIGNIFICANT CHANGE UP (ref 81–99)
NRBC # BLD: 0 /100 WBCS — SIGNIFICANT CHANGE UP (ref 0–0)
PLATELET # BLD AUTO: 286 K/UL — SIGNIFICANT CHANGE UP (ref 130–400)
POTASSIUM SERPL-MCNC: 4.9 MMOL/L — SIGNIFICANT CHANGE UP (ref 3.5–5)
POTASSIUM SERPL-SCNC: 4.9 MMOL/L — SIGNIFICANT CHANGE UP (ref 3.5–5)
PROT SERPL-MCNC: 6 G/DL — SIGNIFICANT CHANGE UP (ref 6–8)
PROTHROM AB SERPL-ACNC: 15.2 SEC — HIGH (ref 9.95–12.87)
RBC # BLD: 5.53 M/UL — HIGH (ref 4.2–5.4)
RBC # FLD: 14 % — SIGNIFICANT CHANGE UP (ref 11.5–14.5)
SODIUM SERPL-SCNC: 137 MMOL/L — SIGNIFICANT CHANGE UP (ref 135–146)
WBC # BLD: 10.75 K/UL — SIGNIFICANT CHANGE UP (ref 4.8–10.8)
WBC # FLD AUTO: 10.75 K/UL — SIGNIFICANT CHANGE UP (ref 4.8–10.8)

## 2022-08-02 PROCEDURE — 99239 HOSP IP/OBS DSCHRG MGMT >30: CPT

## 2022-08-02 PROCEDURE — 99222 1ST HOSP IP/OBS MODERATE 55: CPT

## 2022-08-02 RX ORDER — BUDESONIDE AND FORMOTEROL FUMARATE DIHYDRATE 160; 4.5 UG/1; UG/1
2 AEROSOL RESPIRATORY (INHALATION)
Qty: 1 | Refills: 0
Start: 2022-08-02 | End: 2022-08-31

## 2022-08-02 RX ORDER — METOPROLOL TARTRATE 50 MG
1 TABLET ORAL
Qty: 60 | Refills: 0
Start: 2022-08-02 | End: 2022-08-31

## 2022-08-02 RX ORDER — ALBUTEROL 90 UG/1
2 AEROSOL, METERED ORAL
Qty: 1 | Refills: 0
Start: 2022-08-02 | End: 2022-08-31

## 2022-08-02 RX ORDER — METOPROLOL TARTRATE 50 MG
1 TABLET ORAL
Qty: 0 | Refills: 0 | DISCHARGE

## 2022-08-02 RX ORDER — AMLODIPINE BESYLATE 2.5 MG/1
1 TABLET ORAL
Qty: 0 | Refills: 0 | DISCHARGE

## 2022-08-02 RX ADMIN — Medication 600 MILLIGRAM(S): at 05:50

## 2022-08-02 RX ADMIN — Medication 25 MILLIGRAM(S): at 00:00

## 2022-08-02 RX ADMIN — RIVAROXABAN 15 MILLIGRAM(S): KIT at 11:50

## 2022-08-02 RX ADMIN — Medication 6 MILLIGRAM(S): at 05:50

## 2022-08-02 RX ADMIN — LEVETIRACETAM 500 MILLIGRAM(S): 250 TABLET, FILM COATED ORAL at 05:50

## 2022-08-02 RX ADMIN — Medication 25 MILLIGRAM(S): at 05:50

## 2022-08-02 RX ADMIN — Medication 25 MILLIGRAM(S): at 11:50

## 2022-08-02 RX ADMIN — Medication 25 MICROGRAM(S): at 05:50

## 2022-08-02 NOTE — DISCHARGE NOTE NURSING/CASE MANAGEMENT/SOCIAL WORK - NSDCPEFALRISK_GEN_ALL_CORE
For information on Fall & Injury Prevention, visit: [email or web address] OR  [email or web address] OR  https://www.cdc.gov/steadi/patient.html

## 2022-08-02 NOTE — CONSULT NOTE ADULT - ASSESSMENT
Impression:  cough hyperactive airway   covid PNA         Plan:  steroid tape r  start symbicort 80 Q 12 hrs   proair as needed   check pox on RA rest and exertion   dvt prophylxis   check procal   follow as outpatient 
  ASSESSMENT  88yo PMHx AFib, Hypothroid, HTN, Seizures began having coughing last week who presents with COVID    IMPRESSION  #COVID-19, severe  #Atrial Fibrillation  #Seizures  #Obesity BMI (kg/m2): 26.2, 25.1  #Abx allergy: NKDA    RECOMMENDATIONS  - continue RDV for 5 day course  - continue dex 6 mg daily for at least 10 days   - stop antibiotics as procalcitonin is negative   - wean O2 as tolerated    Please call or message on [employer] Teams if with any questions.  Spectra [phone]

## 2022-08-02 NOTE — CONSULT NOTE ADULT - SUBJECTIVE AND OBJECTIVE BOX
Patient is a 87y old  Female who presents with a chief complaint of COVID 19 (01 Aug 2022 14:26)      HPI:  88yo PMHx AFib, Hypothroid, HTN, Seizures began having coughing last week. Tested on Thursday and was negative but sxs got progressively worse and on Sunday she presented to ER and was diagnosed with COVID and sent home. Pt states she got progressively worse and has not slept because of the cough so she returned to ER today. Pt denies any CP, palpitations, abdominal pain, dysuria. Pt has no known sick contacts. Pt admits to vaccine x 3 - last dose unknown.  (27 Jul 2022 16:46)  this morning she on RA comfortable still has dry cough     PAST MEDICAL & SURGICAL HISTORY:  Hypothyroid      Afib      HTN (hypertension)      History of seizure          FAMILY HISTORY:    Family history: No family cardiovascular system   Occupation:  Alochol: Denied  Smoking: Denied  Drug Use: Denied  Marital Status:           Allergies    No Known Allergies    Intolerances        Home Medications:  amLODIPine 5 mg oral tablet: 1 tab(s) orally once a day (24 Jul 2022 11:33)  levETIRAcetam 500 mg oral tablet: 1 tab(s) orally 2 times a day (24 Jul 2022 11:33)  levothyroxine 25 mcg (0.025 mg) oral capsule: 1 cap(s) orally once a day (24 Jul 2022 11:33)  metoprolol succinate 25 mg oral tablet, extended release: 1 tab(s) orally once a day (24 Jul 2022 11:33)  metoprolol tartrate 25 mg oral tablet: 1 tab(s) orally every 6 hours (01 Aug 2022 10:10)  Xarelto 15 mg oral tablet: 1 tab(s) orally once a day (in the evening) (24 Jul 2022 11:33)      ROS: as in HPI; All other systems reviewed are negative        PHYSICAL EXAM:  Vital Signs Last 24 Hrs  T(C): 35.7 (02 Aug 2022 05:33), Max: 36.8 (01 Aug 2022 21:00)  T(F): 96.2 (02 Aug 2022 05:33), Max: 98.3 (01 Aug 2022 21:00)  HR: 82 (02 Aug 2022 05:33) (82 - 84)  BP: 136/79 (02 Aug 2022 05:33) (125/82 - 150/70)  BP(mean): --  RR: 16 (02 Aug 2022 05:33) (16 - 16)  SpO2: 93% (02 Aug 2022 08:19) (82% - 96%)    Parameters below as of 02 Aug 2022 08:19  Patient On (Oxygen Delivery Method): room air          GENERAL: NAD, well-groomed, well-developed  HEAD:  Atraumatic, Normocephalic  EYES: EOMI, PERRLA, conjunctiva and sclera clear  ENMT: No tonsillar erythema, exudates, or enlargement; Moist mucous membranes, Good dentition, No lesions  NECK: Supple, No JVD, Normal thyroid  NERVOUS SYSTEM:  Alert & Oriented X3, Good concentration; Motor Strength 5/5 B/L upper and lower extremities; DTRs 2+ intact and symmetric  CHEST/LUNG: Clear to percussion bilaterally; No rales, rhonchi, wheezing, or rubs  HEART: Regular rate and rhythm; No murmurs, rubs, or gallops  ABDOMEN: Soft, Nontender, Nondistended; Bowel sounds present  EXTREMITIES:  2+ Peripheral Pulses, No clubbing, cyanosis, or edema  LYMPH: No lymphadenopathy noted  SKIN: No rashes or lesions    HOSPITAL MEDICATIONS:  MEDICATIONS  (STANDING):  dexAMETHasone  Injectable 6 milliGRAM(s) IV Push daily  guaiFENesin  milliGRAM(s) Oral every 12 hours  levETIRAcetam 500 milliGRAM(s) Oral two times a day  levothyroxine 25 MICROGram(s) Oral daily  metoprolol tartrate 25 milliGRAM(s) Oral every 6 hours  rivaroxaban 15 milliGRAM(s) Oral daily    MEDICATIONS  (PRN):  acetaminophen     Tablet .. 650 milliGRAM(s) Oral every 4 hours PRN Temp greater or equal to 38.5C (101.3F)  acetaminophen  Suppository .. 650 milliGRAM(s) Rectal every 4 hours PRN Temp greater or equal to 38.5C (101.3F)  benzonatate 100 milliGRAM(s) Oral every 8 hours PRN Cough  hydrOXYzine hydrochloride 25 milliGRAM(s) Oral at bedtime PRN Restlessness      LABS:                        14.9   10.75 )-----------( 286      ( 02 Aug 2022 06:46 )             45.6     08-02    137  |  98  |  17  ----------------------------<  105<H>  4.9   |  30  |  0.8    Ca    8.7      02 Aug 2022 06:46    TPro  6.0  /  Alb  3.6  /  TBili  0.4  /  DBili  x   /  AST  13  /  ALT  33  /  AlkPhos  93  08-02    PT/INR - ( 02 Aug 2022 06:46 )   PT: 15.20 sec;   INR: 1.33 ratio                       RADIOLOGY:< from: Xray Chest 1 View- PORTABLE-Routine (Xray Chest 1 View- PORTABLE-Routine in AM.) (07.31.22 @ 06:24) >  Stable bilateral opacities.    < end of copied text >    [ ] Reviewed and interpreted by me    ECHO:    Point of Care Ultrasound Findings;    PFT:

## 2022-08-02 NOTE — DISCHARGE NOTE NURSING/CASE MANAGEMENT/SOCIAL WORK - PATIENT PORTAL LINK FT
You can access the FollowMyHealth Patient Portal offered by [facility] by registering at the following website: [email or web address]. By joining [employer]’s FollowMyHealth portal, you will also be able to view your health information using other applications (apps) compatible with our system.

## 2022-08-05 DIAGNOSIS — Z66 DO NOT RESUSCITATE: ICD-10-CM

## 2022-08-05 DIAGNOSIS — I48.20 CHRONIC ATRIAL FIBRILLATION, UNSPECIFIED: ICD-10-CM

## 2022-08-05 DIAGNOSIS — J12.82 PNEUMONIA DUE TO CORONAVIRUS DISEASE 2019: ICD-10-CM

## 2022-08-05 DIAGNOSIS — G40.909 EPILEPSY, UNSPECIFIED, NOT INTRACTABLE, WITHOUT STATUS EPILEPTICUS: ICD-10-CM

## 2022-08-05 DIAGNOSIS — K80.20 CALCULUS OF GALLBLADDER WITHOUT CHOLECYSTITIS WITHOUT OBSTRUCTION: ICD-10-CM

## 2022-08-05 DIAGNOSIS — E03.9 HYPOTHYROIDISM, UNSPECIFIED: ICD-10-CM

## 2022-08-05 DIAGNOSIS — I10 ESSENTIAL (PRIMARY) HYPERTENSION: ICD-10-CM

## 2022-08-05 DIAGNOSIS — U07.1 COVID-19: ICD-10-CM

## 2022-08-30 ENCOUNTER — APPOINTMENT (OUTPATIENT)
Dept: PULMONOLOGY | Facility: CLINIC | Age: 87
End: 2022-08-30

## 2022-08-30 VITALS
BODY MASS INDEX: 25.11 KG/M2 | OXYGEN SATURATION: 96 % | WEIGHT: 160 LBS | DIASTOLIC BLOOD PRESSURE: 70 MMHG | SYSTOLIC BLOOD PRESSURE: 130 MMHG | HEIGHT: 67 IN | HEART RATE: 95 BPM

## 2022-08-30 PROCEDURE — 94010 BREATHING CAPACITY TEST: CPT

## 2022-08-30 PROCEDURE — 71046 X-RAY EXAM CHEST 2 VIEWS: CPT

## 2022-08-30 PROCEDURE — 99204 OFFICE O/P NEW MOD 45 MIN: CPT | Mod: 25

## 2022-08-30 RX ORDER — ALBUTEROL SULFATE 90 UG/1
108 (90 BASE) INHALANT RESPIRATORY (INHALATION)
Qty: 1 | Refills: 3 | Status: ACTIVE | COMMUNITY
Start: 2022-08-30 | End: 1900-01-01

## 2022-08-30 NOTE — PROCEDURE
[FreeTextEntry1] : Chest PA and Lateral View\par \par Reason: \par \par Shortness of breath and cough\par \par Findings:\par \par Right perihilar persistent opacity. \par \par Will need CT chest. \par

## 2022-08-30 NOTE — REASON FOR VISIT
[Initial] : an initial visit [Abnormal CXR/ Chest CT] : an abnormal CXR/ chest CT [COPD] : COPD [Bronchiectasis] : bronchiectasis [Shortness of Breath] : shortness of breath

## 2022-08-30 NOTE — HISTORY OF PRESENT ILLNESS
[TextBox_4] : 87-year-old female who smoked for about 30 years and quit 35 years ago, has a past medical history of hypertension, hypothyroidism, atrial fibrillation on Xarelto, seizures on Keppra, recently had COVID-19 viral pneumonia for which she was admitted to the hospital.  During her hospital stay she was placed on oxygen and received a course of remdesivir and dexamethasone.  She also is known to have history of bronchiectasis but there are no previous CT of the chest to review.  She is presenting here because she still feels short of breath and fatigue.  Her cough has essentially resolved.  Today we did a repeat chest x-ray in the office and that still shows a persistent right perihilar opacity that has not resolved.

## 2022-08-30 NOTE — PHYSICAL EXAM
[Normal Oropharynx] : normal oropharynx [Normal Appearance] : normal appearance [No Neck Mass] : no neck mass [Normal Rate/Rhythm] : normal rate/rhythm [Normal S1, S2] : normal s1, s2 [No Murmurs] : no murmurs [No Resp Distress] : no resp distress [Clear to Auscultation Bilaterally] : clear to auscultation bilaterally [No Abnormalities] : no abnormalities [Benign] : benign [Normal Gait] : normal gait [No Clubbing] : no clubbing [No Cyanosis] : no cyanosis [No Edema] : no edema [FROM] : FROM [Normal Color/ Pigmentation] : normal color/ pigmentation [No Focal Deficits] : no focal deficits [Oriented x3] : oriented x3 [Normal Affect] : normal affect [TextBox_2] : Short of breath after minimal ambulation: O2 sat is 96%

## 2022-09-01 RX ORDER — METOPROLOL SUCCINATE 25 MG/1
25 TABLET, EXTENDED RELEASE ORAL
Qty: 90 | Refills: 2 | Status: ACTIVE | COMMUNITY
Start: 2021-09-28 | End: 1900-01-01

## 2022-09-08 ENCOUNTER — OUTPATIENT (OUTPATIENT)
Dept: OUTPATIENT SERVICES | Facility: HOSPITAL | Age: 87
LOS: 1 days | Discharge: HOME | End: 2022-09-08

## 2022-09-08 ENCOUNTER — RESULT REVIEW (OUTPATIENT)
Age: 87
End: 2022-09-08

## 2022-09-08 DIAGNOSIS — R91.8 OTHER NONSPECIFIC ABNORMAL FINDING OF LUNG FIELD: ICD-10-CM

## 2022-09-08 PROCEDURE — 71250 CT THORAX DX C-: CPT | Mod: 26

## 2022-09-12 ENCOUNTER — APPOINTMENT (OUTPATIENT)
Dept: CARDIOLOGY | Facility: CLINIC | Age: 87
End: 2022-09-12

## 2022-09-12 VITALS — DIASTOLIC BLOOD PRESSURE: 80 MMHG | RESPIRATION RATE: 18 BRPM | SYSTOLIC BLOOD PRESSURE: 128 MMHG | HEART RATE: 98 BPM

## 2022-09-12 VITALS — HEART RATE: 98 BPM | WEIGHT: 164 LBS | BODY MASS INDEX: 25.74 KG/M2 | HEIGHT: 67 IN

## 2022-09-12 PROCEDURE — 99214 OFFICE O/P EST MOD 30 MIN: CPT | Mod: 25

## 2022-09-12 PROCEDURE — 93000 ELECTROCARDIOGRAM COMPLETE: CPT

## 2022-09-12 NOTE — PHYSICAL EXAM
[Well Developed] : well developed [Well Nourished] : well nourished [No Acute Distress] : no acute distress [Normal Conjunctiva] : normal conjunctiva [Normal Venous Pressure] : normal venous pressure [No Carotid Bruit] : no carotid bruit [Normal S1, S2] : normal S1, S2 [No Murmur] : no murmur [No Rub] : no rub [No Gallop] : no gallop [Murmur] : murmur [Clear Lung Fields] : clear lung fields [Good Air Entry] : good air entry [No Respiratory Distress] : no respiratory distress  [Soft] : abdomen soft [Non Tender] : non-tender [No Masses/organomegaly] : no masses/organomegaly [Normal Bowel Sounds] : normal bowel sounds [Normal Gait] : normal gait [No Edema] : no edema [No Cyanosis] : no cyanosis [No Clubbing] : no clubbing [No Varicosities] : no varicosities [No Rash] : no rash [No Skin Lesions] : no skin lesions [Moves all extremities] : moves all extremities [No Focal Deficits] : no focal deficits [Normal Speech] : normal speech [Alert and Oriented] : alert and oriented [Normal memory] : normal memory [de-identified] : II VI systolic

## 2022-10-03 ENCOUNTER — APPOINTMENT (OUTPATIENT)
Dept: PULMONOLOGY | Facility: CLINIC | Age: 87
End: 2022-10-03

## 2022-10-03 VITALS
OXYGEN SATURATION: 94 % | HEIGHT: 67 IN | WEIGHT: 160 LBS | BODY MASS INDEX: 25.11 KG/M2 | DIASTOLIC BLOOD PRESSURE: 80 MMHG | SYSTOLIC BLOOD PRESSURE: 140 MMHG | HEART RATE: 85 BPM

## 2022-10-03 PROCEDURE — 99213 OFFICE O/P EST LOW 20 MIN: CPT

## 2022-10-03 NOTE — HISTORY OF PRESENT ILLNESS
[TextBox_4] : 87-year-old female who smoked for about 30 years and quit 35 years ago, has a past medical history of hypertension, hypothyroidism, atrial fibrillation on Xarelto, seizures on Keppra, recently had COVID-19 viral pneumonia for which she was admitted to the hospital.  During her hospital stay she was placed on oxygen and received a course of remdesivir and dexamethasone.  She also is known to have history of bronchiectasis but there are no previous CT of the chest to review.  She is presenting here because she still feels short of breath and fatigue.  Her cough has essentially resolved.  Today we did a repeat chest x-ray in the office and that still shows a persistent right perihilar opacity that has not resolved.\par \par 10/3/2022: CT of the chest reviewed with bronchiectatic changes in the right middle lobe and lingula which are suggestive of chronic MAC infection and likely stable from prior.  She also has small nodules measuring 6 x 3 mm.  We will do a repeat CT of the chest in 6 months.  On the other hand she had dyspnea on exertion which is better with the Symbicort twice daily.  Her lungs are clear on exam today.  Will continue the same inhaler regimen.  \par .\par

## 2022-10-17 ENCOUNTER — TRANSCRIPTION ENCOUNTER (OUTPATIENT)
Age: 87
End: 2022-10-17

## 2023-01-17 ENCOUNTER — APPOINTMENT (OUTPATIENT)
Dept: PULMONOLOGY | Facility: CLINIC | Age: 88
End: 2023-01-17
Payer: MEDICARE

## 2023-01-17 VITALS
RESPIRATION RATE: 14 BRPM | OXYGEN SATURATION: 97 % | BODY MASS INDEX: 25.11 KG/M2 | HEIGHT: 67 IN | HEART RATE: 86 BPM | DIASTOLIC BLOOD PRESSURE: 80 MMHG | SYSTOLIC BLOOD PRESSURE: 130 MMHG | WEIGHT: 160 LBS

## 2023-01-17 PROCEDURE — 99213 OFFICE O/P EST LOW 20 MIN: CPT

## 2023-01-17 NOTE — REVIEW OF SYSTEMS
[Negative] : Endocrine [Cough] : no cough [Dyspnea] : no dyspnea [SOB on Exertion] : no sob on exertion [TextBox_30] : resolved

## 2023-01-17 NOTE — HISTORY OF PRESENT ILLNESS
[TextBox_4] : 87-year-old female who smoked for about 30 years and quit 35 years ago, has a past medical history of hypertension, hypothyroidism, atrial fibrillation on Xarelto, seizures on Keppra, recently had COVID-19 viral pneumonia for which she was admitted to the hospital.  During her hospital stay she was placed on oxygen and received a course of remdesivir and dexamethasone.  She also is known to have history of bronchiectasis but there are no previous CT of the chest to review.  She is presenting here because she still feels short of breath and fatigue.  Her cough has essentially resolved.  Today we did a repeat chest x-ray in the office and that still shows a persistent right perihilar opacity that has not resolved.\par \par 1/17/23: CT chest reviewed with bronchiectatic changes likely related to atypical mycobacterial infection. She has no cough, no shortness of breath. She feels much better on the symbicort which we'll continue. She also has lung nodules on the CT that will need short term follow up in 3 months.  On exam today her lungs are clear and her O2 saturation is adequate.\par

## 2023-01-17 NOTE — REASON FOR VISIT
Isa from ER triage and I spoke about preliminary findings of venous exam being negative for DVT right arm.    [Initial] : an initial visit [Abnormal CXR/ Chest CT] : an abnormal CXR/ chest CT [COPD] : COPD [Bronchiectasis] : bronchiectasis [Shortness of Breath] : shortness of breath

## 2023-03-13 ENCOUNTER — APPOINTMENT (OUTPATIENT)
Dept: CARDIOLOGY | Facility: CLINIC | Age: 88
End: 2023-03-13
Payer: MEDICARE

## 2023-03-13 VITALS — DIASTOLIC BLOOD PRESSURE: 80 MMHG | RESPIRATION RATE: 18 BRPM | SYSTOLIC BLOOD PRESSURE: 120 MMHG

## 2023-03-13 VITALS — HEIGHT: 67 IN | WEIGHT: 166 LBS | HEART RATE: 79 BPM | BODY MASS INDEX: 26.06 KG/M2

## 2023-03-13 PROCEDURE — 99214 OFFICE O/P EST MOD 30 MIN: CPT | Mod: 25

## 2023-03-13 PROCEDURE — 93000 ELECTROCARDIOGRAM COMPLETE: CPT

## 2023-03-13 NOTE — PHYSICAL EXAM
[Well Developed] : well developed [Well Nourished] : well nourished [No Acute Distress] : no acute distress [Normal Conjunctiva] : normal conjunctiva [Normal Venous Pressure] : normal venous pressure [No Carotid Bruit] : no carotid bruit [Normal S1, S2] : normal S1, S2 [No Murmur] : no murmur [No Rub] : no rub [No Gallop] : no gallop [Murmur] : murmur [Clear Lung Fields] : clear lung fields [Good Air Entry] : good air entry [No Respiratory Distress] : no respiratory distress  [Soft] : abdomen soft [Non Tender] : non-tender [No Masses/organomegaly] : no masses/organomegaly [Normal Bowel Sounds] : normal bowel sounds [Normal Gait] : normal gait [No Edema] : no edema [No Cyanosis] : no cyanosis [No Clubbing] : no clubbing [No Varicosities] : no varicosities [No Rash] : no rash [No Skin Lesions] : no skin lesions [Moves all extremities] : moves all extremities [No Focal Deficits] : no focal deficits [Normal Speech] : normal speech [Alert and Oriented] : alert and oriented [Normal memory] : normal memory [de-identified] : II VI systolic

## 2023-03-20 ENCOUNTER — INPATIENT (INPATIENT)
Facility: HOSPITAL | Age: 88
LOS: 1 days | Discharge: HOME HEALTH | DRG: 194 | End: 2023-03-22
Attending: STUDENT IN AN ORGANIZED HEALTH CARE EDUCATION/TRAINING PROGRAM | Admitting: HOSPITALIST
Payer: MEDICARE

## 2023-03-20 VITALS
TEMPERATURE: 98 F | DIASTOLIC BLOOD PRESSURE: 76 MMHG | HEART RATE: 118 BPM | SYSTOLIC BLOOD PRESSURE: 140 MMHG | OXYGEN SATURATION: 93 % | RESPIRATION RATE: 22 BRPM

## 2023-03-20 DIAGNOSIS — J18.9 PNEUMONIA, UNSPECIFIED ORGANISM: ICD-10-CM

## 2023-03-20 LAB
ALBUMIN SERPL ELPH-MCNC: 3.8 G/DL — SIGNIFICANT CHANGE UP (ref 3.5–5.2)
ALP SERPL-CCNC: 92 U/L — SIGNIFICANT CHANGE UP (ref 30–115)
ALT FLD-CCNC: 21 U/L — SIGNIFICANT CHANGE UP (ref 0–41)
ANION GAP SERPL CALC-SCNC: 12 MMOL/L — SIGNIFICANT CHANGE UP (ref 7–14)
AST SERPL-CCNC: 23 U/L — SIGNIFICANT CHANGE UP (ref 0–41)
BASOPHILS # BLD AUTO: 0.02 K/UL — SIGNIFICANT CHANGE UP (ref 0–0.2)
BASOPHILS NFR BLD AUTO: 0.4 % — SIGNIFICANT CHANGE UP (ref 0–1)
BILIRUB SERPL-MCNC: 0.6 MG/DL — SIGNIFICANT CHANGE UP (ref 0.2–1.2)
BUN SERPL-MCNC: 14 MG/DL — SIGNIFICANT CHANGE UP (ref 10–20)
CALCIUM SERPL-MCNC: 8.9 MG/DL — SIGNIFICANT CHANGE UP (ref 8.4–10.5)
CHLORIDE SERPL-SCNC: 97 MMOL/L — LOW (ref 98–110)
CO2 SERPL-SCNC: 25 MMOL/L — SIGNIFICANT CHANGE UP (ref 17–32)
CREAT SERPL-MCNC: 0.8 MG/DL — SIGNIFICANT CHANGE UP (ref 0.7–1.5)
EGFR: 71 ML/MIN/1.73M2 — SIGNIFICANT CHANGE UP
EOSINOPHIL # BLD AUTO: 0.04 K/UL — SIGNIFICANT CHANGE UP (ref 0–0.7)
EOSINOPHIL NFR BLD AUTO: 0.7 % — SIGNIFICANT CHANGE UP (ref 0–8)
FLUAV AG NPH QL: SIGNIFICANT CHANGE UP
FLUBV AG NPH QL: SIGNIFICANT CHANGE UP
GLUCOSE SERPL-MCNC: 103 MG/DL — HIGH (ref 70–99)
HCT VFR BLD CALC: 43.3 % — SIGNIFICANT CHANGE UP (ref 37–47)
HGB BLD-MCNC: 14.3 G/DL — SIGNIFICANT CHANGE UP (ref 12–16)
IMM GRANULOCYTES NFR BLD AUTO: 0.2 % — SIGNIFICANT CHANGE UP (ref 0.1–0.3)
LACTATE SERPL-SCNC: 1.2 MMOL/L — SIGNIFICANT CHANGE UP (ref 0.7–2)
LYMPHOCYTES # BLD AUTO: 1.09 K/UL — LOW (ref 1.2–3.4)
LYMPHOCYTES # BLD AUTO: 19.6 % — LOW (ref 20.5–51.1)
MCHC RBC-ENTMCNC: 26.5 PG — LOW (ref 27–31)
MCHC RBC-ENTMCNC: 33 G/DL — SIGNIFICANT CHANGE UP (ref 32–37)
MCV RBC AUTO: 80.2 FL — LOW (ref 81–99)
MONOCYTES # BLD AUTO: 0.61 K/UL — HIGH (ref 0.1–0.6)
MONOCYTES NFR BLD AUTO: 11 % — HIGH (ref 1.7–9.3)
NEUTROPHILS # BLD AUTO: 3.78 K/UL — SIGNIFICANT CHANGE UP (ref 1.4–6.5)
NEUTROPHILS NFR BLD AUTO: 68.1 % — SIGNIFICANT CHANGE UP (ref 42.2–75.2)
NRBC # BLD: 0 /100 WBCS — SIGNIFICANT CHANGE UP (ref 0–0)
PLATELET # BLD AUTO: 162 K/UL — SIGNIFICANT CHANGE UP (ref 130–400)
POTASSIUM SERPL-MCNC: 4.1 MMOL/L — SIGNIFICANT CHANGE UP (ref 3.5–5)
POTASSIUM SERPL-SCNC: 4.1 MMOL/L — SIGNIFICANT CHANGE UP (ref 3.5–5)
PROT SERPL-MCNC: 6.6 G/DL — SIGNIFICANT CHANGE UP (ref 6–8)
RBC # BLD: 5.4 M/UL — SIGNIFICANT CHANGE UP (ref 4.2–5.4)
RBC # FLD: 14.5 % — SIGNIFICANT CHANGE UP (ref 11.5–14.5)
RSV RNA NPH QL NAA+NON-PROBE: SIGNIFICANT CHANGE UP
SARS-COV-2 RNA SPEC QL NAA+PROBE: SIGNIFICANT CHANGE UP
SODIUM SERPL-SCNC: 134 MMOL/L — LOW (ref 135–146)
WBC # BLD: 5.55 K/UL — SIGNIFICANT CHANGE UP (ref 4.8–10.8)
WBC # FLD AUTO: 5.55 K/UL — SIGNIFICANT CHANGE UP (ref 4.8–10.8)

## 2023-03-20 PROCEDURE — 94640 AIRWAY INHALATION TREATMENT: CPT

## 2023-03-20 PROCEDURE — 99223 1ST HOSP IP/OBS HIGH 75: CPT

## 2023-03-20 PROCEDURE — 36415 COLL VENOUS BLD VENIPUNCTURE: CPT

## 2023-03-20 PROCEDURE — 85025 COMPLETE CBC W/AUTO DIFF WBC: CPT

## 2023-03-20 PROCEDURE — 80053 COMPREHEN METABOLIC PANEL: CPT

## 2023-03-20 PROCEDURE — 99285 EMERGENCY DEPT VISIT HI MDM: CPT

## 2023-03-20 PROCEDURE — 71045 X-RAY EXAM CHEST 1 VIEW: CPT | Mod: 26

## 2023-03-20 PROCEDURE — 80048 BASIC METABOLIC PNL TOTAL CA: CPT

## 2023-03-20 PROCEDURE — 85027 COMPLETE CBC AUTOMATED: CPT

## 2023-03-20 RX ORDER — IPRATROPIUM/ALBUTEROL SULFATE 18-103MCG
3 AEROSOL WITH ADAPTER (GRAM) INHALATION ONCE
Refills: 0 | Status: COMPLETED | OUTPATIENT
Start: 2023-03-20 | End: 2023-03-20

## 2023-03-20 RX ORDER — LEVOTHYROXINE SODIUM 125 MCG
25 TABLET ORAL DAILY
Refills: 0 | Status: DISCONTINUED | OUTPATIENT
Start: 2023-03-20 | End: 2023-03-22

## 2023-03-20 RX ORDER — ONDANSETRON 8 MG/1
4 TABLET, FILM COATED ORAL EVERY 8 HOURS
Refills: 0 | Status: DISCONTINUED | OUTPATIENT
Start: 2023-03-20 | End: 2023-03-22

## 2023-03-20 RX ORDER — ACETAMINOPHEN 500 MG
650 TABLET ORAL EVERY 6 HOURS
Refills: 0 | Status: DISCONTINUED | OUTPATIENT
Start: 2023-03-20 | End: 2023-03-22

## 2023-03-20 RX ORDER — BUDESONIDE AND FORMOTEROL FUMARATE DIHYDRATE 160; 4.5 UG/1; UG/1
2 AEROSOL RESPIRATORY (INHALATION) EVERY 12 HOURS
Refills: 0 | Status: DISCONTINUED | OUTPATIENT
Start: 2023-03-20 | End: 2023-03-22

## 2023-03-20 RX ORDER — LEVETIRACETAM 250 MG/1
500 TABLET, FILM COATED ORAL
Refills: 0 | Status: DISCONTINUED | OUTPATIENT
Start: 2023-03-20 | End: 2023-03-22

## 2023-03-20 RX ORDER — IPRATROPIUM/ALBUTEROL SULFATE 18-103MCG
3 AEROSOL WITH ADAPTER (GRAM) INHALATION EVERY 12 HOURS
Refills: 0 | Status: DISCONTINUED | OUTPATIENT
Start: 2023-03-20 | End: 2023-03-20

## 2023-03-20 RX ORDER — LEVOFLOXACIN 5 MG/ML
1 INJECTION, SOLUTION INTRAVENOUS
Qty: 10 | Refills: 0
Start: 2023-03-20 | End: 2023-03-29

## 2023-03-20 RX ORDER — CEFTRIAXONE 500 MG/1
1000 INJECTION, POWDER, FOR SOLUTION INTRAMUSCULAR; INTRAVENOUS ONCE
Refills: 0 | Status: COMPLETED | OUTPATIENT
Start: 2023-03-20 | End: 2023-03-20

## 2023-03-20 RX ORDER — SODIUM CHLORIDE 9 MG/ML
1000 INJECTION INTRAMUSCULAR; INTRAVENOUS; SUBCUTANEOUS ONCE
Refills: 0 | Status: COMPLETED | OUTPATIENT
Start: 2023-03-20 | End: 2023-03-20

## 2023-03-20 RX ORDER — RIVAROXABAN 15 MG-20MG
15 KIT ORAL
Refills: 0 | Status: DISCONTINUED | OUTPATIENT
Start: 2023-03-20 | End: 2023-03-22

## 2023-03-20 RX ORDER — LANOLIN ALCOHOL/MO/W.PET/CERES
3 CREAM (GRAM) TOPICAL AT BEDTIME
Refills: 0 | Status: DISCONTINUED | OUTPATIENT
Start: 2023-03-20 | End: 2023-03-22

## 2023-03-20 RX ORDER — METOPROLOL TARTRATE 50 MG
25 TABLET ORAL
Refills: 0 | Status: DISCONTINUED | OUTPATIENT
Start: 2023-03-20 | End: 2023-03-22

## 2023-03-20 RX ORDER — IPRATROPIUM/ALBUTEROL SULFATE 18-103MCG
3 AEROSOL WITH ADAPTER (GRAM) INHALATION EVERY 12 HOURS
Refills: 0 | Status: DISCONTINUED | OUTPATIENT
Start: 2023-03-20 | End: 2023-03-22

## 2023-03-20 RX ORDER — ALBUTEROL 90 UG/1
2 AEROSOL, METERED ORAL EVERY 6 HOURS
Refills: 0 | Status: DISCONTINUED | OUTPATIENT
Start: 2023-03-20 | End: 2023-03-22

## 2023-03-20 RX ORDER — AZITHROMYCIN 500 MG/1
500 TABLET, FILM COATED ORAL EVERY 24 HOURS
Refills: 0 | Status: DISCONTINUED | OUTPATIENT
Start: 2023-03-20 | End: 2023-03-22

## 2023-03-20 RX ADMIN — Medication 125 MILLIGRAM(S): at 12:51

## 2023-03-20 RX ADMIN — Medication 25 MILLIGRAM(S): at 18:48

## 2023-03-20 RX ADMIN — Medication 3 MILLILITER(S): at 12:51

## 2023-03-20 RX ADMIN — LEVETIRACETAM 500 MILLIGRAM(S): 250 TABLET, FILM COATED ORAL at 18:48

## 2023-03-20 RX ADMIN — SODIUM CHLORIDE 2000 MILLILITER(S): 9 INJECTION INTRAMUSCULAR; INTRAVENOUS; SUBCUTANEOUS at 12:51

## 2023-03-20 RX ADMIN — BUDESONIDE AND FORMOTEROL FUMARATE DIHYDRATE 2 PUFF(S): 160; 4.5 AEROSOL RESPIRATORY (INHALATION) at 22:09

## 2023-03-20 RX ADMIN — Medication 3 MILLILITER(S): at 21:19

## 2023-03-20 RX ADMIN — CEFTRIAXONE 100 MILLIGRAM(S): 500 INJECTION, POWDER, FOR SOLUTION INTRAMUSCULAR; INTRAVENOUS at 21:00

## 2023-03-20 RX ADMIN — RIVAROXABAN 15 MILLIGRAM(S): KIT at 18:48

## 2023-03-20 RX ADMIN — AZITHROMYCIN 255 MILLIGRAM(S): 500 TABLET, FILM COATED ORAL at 22:00

## 2023-03-20 RX ADMIN — Medication 100 MILLIGRAM(S): at 22:09

## 2023-03-20 NOTE — PATIENT PROFILE ADULT - FALL HARM RISK - HARM RISK INTERVENTIONS

## 2023-03-20 NOTE — H&P ADULT - HISTORY OF PRESENT ILLNESS
89yo female with PMHx COPD, Afib, Hypothyroid, HTN and seizures presents with c/o cough x 3 days. Pt denies sputum production, fever/chills, sick close contacts. Pt admits she is always SOB and is not much off her baseline. This morning upon awakening she had a coughing fit that lasted 3 hours and so she decided to come in for evaluation.     In ER CXR with L opacities and PO  93% on RA 89yo female with PMHx COPD, hx/o bronchiectasis, , Afib, Hypothyroid, HTN and seizures presents with worsening cough x 3 days. Pt denies sputum production, fever/chills, sick close contacts. Pt admits she is always SOB and is not much off her baseline. This morning upon awakening she had a coughing fit that lasted 3 hours and so she decided to come in for evaluation.     In ER CXR with L opacities and PO  93% on RA

## 2023-03-20 NOTE — H&P ADULT - NSHPSOCIALHISTORY_GEN_ALL_CORE
Lives alone  Quit smoking 40 years ago  Denies ETOH Lives alone  Quit smoking 40 years ago  Denies ETOH  Denies drug use.

## 2023-03-20 NOTE — H&P ADULT - ASSESSMENT
89yo female with PMHX Afib, COPD, HTN, Hypothyroid, bleeding ulcer and seizure presents with cough. Found to have L PNA.  Pt was initially in RVR but is not controlled        Plan:  1. PNA  - Ceftriaxone and Zithro IV  - supp O2 as needed for comfort and PO >92  - Nebs Q12 PRN  - IS 10x/h  - Tessalon perles 100 Q8 PRN  - monitor labs    2. Afib - rate controlled  - cont Metoprolol  - cont Xarelto    3. HTN  - monitor BP  - cont Norvasc    4. Hypothyroid  - cont Synthroid    5. Seizure  - precautions  - Keppra 500mg Q12    6. COPD  - cont Symbicort   - cont Albuterol PRN    7. DVT  - already on AC    All above D/W Dr White 89yo female with PMHX Afib, COPD, HTN, Hypothyroid, bleeding ulcer and seizure presents with cough. Found to have L PNA.  Pt was initially in RVR but is now controlled        Plan:  1. PNA  - Ceftriaxone and Zithro IV  - supp O2 as needed for comfort and PO >92  - Nebs Q12 PRN  - IS 10x/h  - Tessalon perles 100 Q8 PRN  - monitor labs    2. Afib - rate controlled  - cont Metoprolol  - cont Xarelto    3. HTN  - monitor BP  - cont Norvasc    4. Hypothyroid  - cont Synthroid    5. Seizure  - precautions  - Keppra 500mg Q12    6. COPD  - cont Symbicort   - cont Albuterol PRN    7. DVT  - already on AC    All above D/W Dr White 89yo female with PMHX Afib, COPD, HTN, Hypothyroid, bleeding ulcer and seizure presents with cough. Found to have L PNA.  Pt was initially in RVR but is now controlled      LLL. PNA  - Ceftriaxone and Zithro IV  - supp O2 as needed for comfort and PO >92  - Nebs Q12  - IS 10x/h  - Tessalon perles 100 Q8 PRN  - monitor labs    Chronic Afib with RVR- rate controlled  - cont Metoprolol  - cont Xarelto  - HR now controlled     HTN  - monitor BP  - cont metoprolol     Hypothyroid  - cont Synthroid    Seizure  - precautions  - Keppra 500mg Q12    COPD/ Hx/o pulmonary nodules  - follow up with pulmonary   - cont Symbicort   - cont Albuterol PRN     DVT px   -cont xaretlo

## 2023-03-20 NOTE — ED PROVIDER NOTE - NSFOLLOWUPINSTRUCTIONS_ED_ALL_ED_FT
Pneumonia    Pneumonia is an infection of the lungs. Pneumonia may be caused by bacteria, viruses, or funguses. Symptoms include coughing, fever, chest pain when breathing deeply or coughing, shortness of breath, fatigue, or muscle aches. Pneumonia can be diagnosed with a medical history and physical exam, as well as other tests which may include a chest X-ray. If you were prescribed an antibiotic medicine, take it as told by your health care provider and do not stop taking the antibiotic even if you start to feel better. Do not use tobacco products, including cigarettes, chewing tobacco, and e-cigarettes.    SEEK IMMEDIATE MEDICAL CARE IF YOU HAVE ANY OF THE FOLLOWING SYMPTOMS: worsening shortness of breath, worsening chest pain, coughing up blood, change in mental status, lightheadedness/dizziness.     Follow-up with your primary doctor sometime next week.  Bring all diagnostic testing with you.

## 2023-03-20 NOTE — ED PROVIDER NOTE - CLINICAL SUMMARY MEDICAL DECISION MAKING FREE TEXT BOX
In my opinion, in patient treatment is medically justifiable and appropriate.  unstable VS.  possible sepsis.

## 2023-03-20 NOTE — H&P ADULT - NSHPPHYSICALEXAM_GEN_ALL_CORE
T(C): 36.1 (03-20-23 @ 14:11), Max: 36.5 (03-20-23 @ 12:06)  HR: 108 (03-20-23 @ 15:43) (108 - 128)  BP: 142/82 (03-20-23 @ 15:43) (140/76 - 184/84)  RR: 22 (03-20-23 @ 15:43) (22 - 22)  SpO2: 95% (03-20-23 @ 15:43) (92% - 95%)    PHYSICAL EXAM:  GENERAL: NAD, well-developed, well nourished, looks stated age  HEAD:  Atraumatic, Normocephalic  EYES: EOMI, PERRLA, conjunctiva and sclera clear  NECK: Supple, No JVD, no bruits, no masses, no thyroid enlargement  ENMT: No tonsillar erythema, exudated or enlargement, moist mucous membranes  CHEST/LUNG: No rales, + rhonchi + wheezing, no dullness to percussion  HEART: S1,S2 irregular rate and rhythm; No murmurs, rubs, or gallops  ABDOMEN: Soft, nontender, nondistended, no rebound tenderness; No palpable masses, no hernias, no organomegaly; Bowel sounds present and normoactive  EXTREMITIES:  2+ peripheral pulses bilaterally and symmetrically, no clubbing, cyanosis, or edema, no calf tenderness b/l  LYMPH: No lymphadenopathy  PSYCH: AAOx3, normal mood and affect  NEUROLOGY: non-focal, muscle strength 5/5 all extremities, DTRs 2+ symmetrically  SKIN: No rashes or lesions

## 2023-03-20 NOTE — ED PROVIDER NOTE - PHYSICAL EXAMINATION
VITAL SIGNS: I have reviewed nursing notes and confirm.  CONSTITUTIONAL: Well-developed; well-nourished; in no acute distress.  SKIN: Skin exam is warm and dry, no acute rash.  HEAD: Normocephalic; atraumatic.  EYES: PERRL, EOM intact; conjunctiva and sclera clear.  ENT: No nasal discharge; airway clear. Throat clear.  NECK: Supple; non tender. + JVD.  CARD: S1, S2 normal; no murmurs, gallops, or rubs. Irregular rate and rhythm.  RESP: + wheezes,  +rales +rhonchi.  ABD: Normal bowel sounds; soft; non-distended; non-tender; no hepatosplenomegaly.  EXT: Normal ROM. + edema.  NEURO: Alert, oriented. Grossly unremarkable. No focal deficits.  PSYCH: Cooperative, appropriate.

## 2023-03-20 NOTE — ED ADULT TRIAGE NOTE - CHIEF COMPLAINT QUOTE
As per patient I think I have pneumonia I've been coughing for three days, I have copd. not on home oxygen. Denies chest pain

## 2023-03-20 NOTE — ED PROVIDER NOTE - PATIENT PORTAL LINK FT
You can access the FollowMyHealth Patient Portal offered by [facility] by registering at the following website: [email or web address]. By joining [employer]’s FollowMyHealth portal, you will also be able to view your health information using other applications (apps) compatible with our system.

## 2023-03-20 NOTE — ED PROVIDER NOTE - PROGRESS NOTE DETAILS
Ozzie: Discharge contemplated.  However, pulse ox is marginal hypoxia.  In addition the patient's tachycardia has persisted despite IV fluid.  She has mild shortness of breath at rest.  Patient will be admitted for IV antibiotics. Ozzie: Discharge contemplated.  However, pulse ox is marginal hypoxia.  In addition the patient's tachycardia has persisted despite IV fluid.  She has mild shortness of breath at rest.  Patient will be admitted for IV antibiotics.    Chest x-ray reviewed.  Patient has left-sided infiltrates.  This is consistent with pneumonia.

## 2023-03-20 NOTE — ED PROVIDER NOTE - OBJECTIVE STATEMENT
Patient complains of cough shortness of breath.  She has a past medical history of atrial fibrillation on anticoagulation.  She has a past medical history of COPD and bronchiectasis.  Symptoms have been moderate and progressive for the last 3 days.  They are worsened by exertion.  Patient denies chest pain.

## 2023-03-20 NOTE — H&P ADULT - NSHPLABSRESULTS_GEN_ALL_CORE
14.3   5.55  )-----------( 162      ( 20 Mar 2023 12:48 )             43.3       03-20    134<L>  |  97<L>  |  14  ----------------------------<  103<H>  4.1   |  25  |  0.8    Ca    8.9      20 Mar 2023 12:48    TPro  6.6  /  Alb  3.8  /  TBili  0.6  /  DBili  x   /  AST  23  /  ALT  21  /  AlkPhos  92  03-20          RADIOLOGY:  < from: Xray Chest 1 View- PORTABLE-Urgent (03.20.23 @ 12:51) >      Impression:    Left upper and lower lobe opacities. Correlate clinically. Recommend   follow-up to resolution.    Pulmonary nodules seen on prior chest CT not well evaluated   radiographically. Recommend follow-up as per prior CT report.    --- End of Report ---            JUSTIN ALTAMIRANO MD; Attending Radiologist  This document has been electronically signed. Mar 20 2023 12:58PM    < end of copied text >                    Lactate Trend  03-20 @ 12:48 Lactate:1.2

## 2023-03-21 ENCOUNTER — TRANSCRIPTION ENCOUNTER (OUTPATIENT)
Age: 88
End: 2023-03-21

## 2023-03-21 LAB
ALBUMIN SERPL ELPH-MCNC: 3.9 G/DL — SIGNIFICANT CHANGE UP (ref 3.5–5.2)
ALP SERPL-CCNC: 81 U/L — SIGNIFICANT CHANGE UP (ref 30–115)
ALT FLD-CCNC: 21 U/L — SIGNIFICANT CHANGE UP (ref 0–41)
ANION GAP SERPL CALC-SCNC: 13 MMOL/L — SIGNIFICANT CHANGE UP (ref 7–14)
AST SERPL-CCNC: 20 U/L — SIGNIFICANT CHANGE UP (ref 0–41)
BASOPHILS # BLD AUTO: 0 K/UL — SIGNIFICANT CHANGE UP (ref 0–0.2)
BASOPHILS NFR BLD AUTO: 0 % — SIGNIFICANT CHANGE UP (ref 0–1)
BILIRUB SERPL-MCNC: 0.2 MG/DL — SIGNIFICANT CHANGE UP (ref 0.2–1.2)
BUN SERPL-MCNC: 15 MG/DL — SIGNIFICANT CHANGE UP (ref 10–20)
CALCIUM SERPL-MCNC: 9 MG/DL — SIGNIFICANT CHANGE UP (ref 8.4–10.5)
CHLORIDE SERPL-SCNC: 101 MMOL/L — SIGNIFICANT CHANGE UP (ref 98–110)
CO2 SERPL-SCNC: 26 MMOL/L — SIGNIFICANT CHANGE UP (ref 17–32)
CREAT SERPL-MCNC: 0.7 MG/DL — SIGNIFICANT CHANGE UP (ref 0.7–1.5)
EGFR: 83 ML/MIN/1.73M2 — SIGNIFICANT CHANGE UP
EOSINOPHIL # BLD AUTO: 0 K/UL — SIGNIFICANT CHANGE UP (ref 0–0.7)
EOSINOPHIL NFR BLD AUTO: 0 % — SIGNIFICANT CHANGE UP (ref 0–8)
GLUCOSE SERPL-MCNC: 133 MG/DL — HIGH (ref 70–99)
HCT VFR BLD CALC: 41.2 % — SIGNIFICANT CHANGE UP (ref 37–47)
HGB BLD-MCNC: 13.4 G/DL — SIGNIFICANT CHANGE UP (ref 12–16)
IMM GRANULOCYTES NFR BLD AUTO: 0.2 % — SIGNIFICANT CHANGE UP (ref 0.1–0.3)
LYMPHOCYTES # BLD AUTO: 1.03 K/UL — LOW (ref 1.2–3.4)
LYMPHOCYTES # BLD AUTO: 18.8 % — LOW (ref 20.5–51.1)
MCHC RBC-ENTMCNC: 26.4 PG — LOW (ref 27–31)
MCHC RBC-ENTMCNC: 32.5 G/DL — SIGNIFICANT CHANGE UP (ref 32–37)
MCV RBC AUTO: 81.1 FL — SIGNIFICANT CHANGE UP (ref 81–99)
MONOCYTES # BLD AUTO: 0.4 K/UL — SIGNIFICANT CHANGE UP (ref 0.1–0.6)
MONOCYTES NFR BLD AUTO: 7.3 % — SIGNIFICANT CHANGE UP (ref 1.7–9.3)
NEUTROPHILS # BLD AUTO: 4.03 K/UL — SIGNIFICANT CHANGE UP (ref 1.4–6.5)
NEUTROPHILS NFR BLD AUTO: 73.7 % — SIGNIFICANT CHANGE UP (ref 42.2–75.2)
NRBC # BLD: 0 /100 WBCS — SIGNIFICANT CHANGE UP (ref 0–0)
PLATELET # BLD AUTO: 172 K/UL — SIGNIFICANT CHANGE UP (ref 130–400)
POTASSIUM SERPL-MCNC: 4.5 MMOL/L — SIGNIFICANT CHANGE UP (ref 3.5–5)
POTASSIUM SERPL-SCNC: 4.5 MMOL/L — SIGNIFICANT CHANGE UP (ref 3.5–5)
PROT SERPL-MCNC: 6.3 G/DL — SIGNIFICANT CHANGE UP (ref 6–8)
RBC # BLD: 5.08 M/UL — SIGNIFICANT CHANGE UP (ref 4.2–5.4)
RBC # FLD: 14.5 % — SIGNIFICANT CHANGE UP (ref 11.5–14.5)
SODIUM SERPL-SCNC: 140 MMOL/L — SIGNIFICANT CHANGE UP (ref 135–146)
WBC # BLD: 5.47 K/UL — SIGNIFICANT CHANGE UP (ref 4.8–10.8)
WBC # FLD AUTO: 5.47 K/UL — SIGNIFICANT CHANGE UP (ref 4.8–10.8)

## 2023-03-21 PROCEDURE — 99232 SBSQ HOSP IP/OBS MODERATE 35: CPT

## 2023-03-21 RX ORDER — CEFTRIAXONE 500 MG/1
1000 INJECTION, POWDER, FOR SOLUTION INTRAMUSCULAR; INTRAVENOUS EVERY 24 HOURS
Refills: 0 | Status: DISCONTINUED | OUTPATIENT
Start: 2023-03-21 | End: 2023-03-22

## 2023-03-21 RX ADMIN — LEVETIRACETAM 500 MILLIGRAM(S): 250 TABLET, FILM COATED ORAL at 05:21

## 2023-03-21 RX ADMIN — CEFTRIAXONE 100 MILLIGRAM(S): 500 INJECTION, POWDER, FOR SOLUTION INTRAMUSCULAR; INTRAVENOUS at 21:49

## 2023-03-21 RX ADMIN — Medication 100 MILLIGRAM(S): at 14:57

## 2023-03-21 RX ADMIN — BUDESONIDE AND FORMOTEROL FUMARATE DIHYDRATE 2 PUFF(S): 160; 4.5 AEROSOL RESPIRATORY (INHALATION) at 08:52

## 2023-03-21 RX ADMIN — Medication 3 MILLILITER(S): at 08:40

## 2023-03-21 RX ADMIN — Medication 25 MICROGRAM(S): at 05:22

## 2023-03-21 RX ADMIN — Medication 25 MILLIGRAM(S): at 17:32

## 2023-03-21 RX ADMIN — LEVETIRACETAM 500 MILLIGRAM(S): 250 TABLET, FILM COATED ORAL at 17:33

## 2023-03-21 RX ADMIN — AZITHROMYCIN 255 MILLIGRAM(S): 500 TABLET, FILM COATED ORAL at 21:49

## 2023-03-21 RX ADMIN — Medication 3 MILLILITER(S): at 19:46

## 2023-03-21 RX ADMIN — BUDESONIDE AND FORMOTEROL FUMARATE DIHYDRATE 2 PUFF(S): 160; 4.5 AEROSOL RESPIRATORY (INHALATION) at 19:47

## 2023-03-21 RX ADMIN — RIVAROXABAN 15 MILLIGRAM(S): KIT at 17:33

## 2023-03-21 NOTE — DISCHARGE NOTE PROVIDER - CARE PROVIDER_API CALL
[facility]  Internal Medicine  [address] [place] [id number]  Phone: [phone]  Fax: [phone]  Follow Up Time: 1 week

## 2023-03-21 NOTE — DISCHARGE NOTE PROVIDER - NSDCMRMEDTOKEN_GEN_ALL_CORE_FT
albuterol 90 mcg/inh inhalation powder: 2 puff(s) inhaled every 6 hours   levETIRAcetam 500 mg oral tablet: 1 tab(s) orally 2 times a day  levothyroxine 25 mcg (0.025 mg) oral capsule: 1 cap(s) orally once a day  metoprolol tartrate 50 mg oral tablet: 1 tab(s) orally 2 times a day   Symbicort 80 mcg-4.5 mcg/inh inhalation aerosol: 2 puff(s) inhaled 2 times a day   Xarelto 15 mg oral tablet: 1 tab(s) orally once a day (in the evening)   albuterol 90 mcg/inh inhalation powder: 2 puff(s) inhaled every 6 hours   amoxicillin-clavulanate 500 mg-125 mg oral tablet: 1 tab(s) orally every 8 hours   benzonatate 100 mg oral capsule: 1 cap(s) orally 2 times a day   levETIRAcetam 500 mg oral tablet: 1 tab(s) orally 2 times a day  levothyroxine 25 mcg (0.025 mg) oral capsule: 1 cap(s) orally once a day  metoprolol tartrate 50 mg oral tablet: 1 tab(s) orally 2 times a day   Symbicort 80 mcg-4.5 mcg/inh inhalation aerosol: 2 puff(s) inhaled 2 times a day   Xarelto 15 mg oral tablet: 1 tab(s) orally once a day (in the evening)

## 2023-03-21 NOTE — PROGRESS NOTE ADULT - ASSESSMENT
87yo female with PMHX Afib, COPD, HTN, Hypothyroid, bleeding ulcer and seizure presents with cough. Found to have L PNA.  Pt was initially in RVR but is now controlled      LLL. PNA  - Ceftriaxone and Zithro IV  - supp O2 as needed for comfort and PO >92  - Nebs Q12  - IS 10x/h  - Tessalon perles 100 Q8 PRN  - monitor labs    Chronic Afib with RVR- rate controlled  - cont Metoprolol  - cont Xarelto  - HR now controlled     HTN  - monitor BP  - cont metoprolol     Hypothyroid  - cont Synthroid    Seizure  - precautions  - Keppra 500mg Q12    COPD/ Hx/o pulmonary nodules  - follow up with pulmonary   - cont Symbicort   - cont Albuterol PRN     DVT px   -cont xaretlo        87yo female with PMHX Afib, COPD, HTN, Hypothyroid, bleeding ulcer and seizure presents with cough. Found to have L PNA.  Pt was initially in RVR but is now controlled      LLL. PNA  - Ceftriaxone and Zithro IV D2, if stable dc on augmentin BID for 4 more days (7 day course),   - supp O2 as needed for comfort and PO >92  - Nebs Q12H ATC   - IS 10x/h  - Tessalon perles 100 Q8 PRN  - monitor labs, now on RA.     Chronic Afib with RVR  - cont Metoprolol  - cont Xarelto  - HR now controlled     HTN  - monitor BP  - cont metoprolol     Hypothyroid  - cont Synthroid    Seizure  - precautions  - Keppra 500mg Q12    COPD/ Hx/o pulmonary nodules  - follow up with pulmonary   - cont Symbicort   - cont Albuterol PRN     DVT px   -cont xaretlo     Handoff: anticipate DC in am

## 2023-03-21 NOTE — DISCHARGE NOTE PROVIDER - HOSPITAL COURSE
A 89 yo female with PMHX Afib, COPD, HTN, Hypothyroid, bleeding ulcer and seizure presents with cough. Found to have L PNA.  Pt  treated with IV abx, O2 supplemented as needed, being tapered off O2. Pt afebrile, wbc wnl. PT to be discharged on PO abx and follow up with pcp.

## 2023-03-21 NOTE — PROGRESS NOTE ADULT - SUBJECTIVE AND OBJECTIVE BOX
BRADLY [id number]    Subjective/Interval History       ROS    PHYSICAL EXAM  Vital Signs Last 24 Hrs  T(C): 36.3 (21 Mar 2023 05:23), Max: 36.4 (20 Mar 2023 21:09)  T(F): 97.4 (21 Mar 2023 05:23), Max: 97.5 (20 Mar 2023 21:09)  HR: 75 (21 Mar 2023 05:23) (75 - 128)  BP: 152/70 (21 Mar 2023 05:23) (142/82 - 184/84)  BP(mean): 105 (20 Mar 2023 18:39) (105 - 105)  RR: 18 (21 Mar 2023 05:23) (18 - 22)  SpO2: 91% (21 Mar 2023 13:39) (91% - 96%)    Parameters below as of 21 Mar 2023 13:39  Patient On (Oxygen Delivery Method): room air      GA : AAOX3, NAD   HEENT: PERRLA, EOMI  NECK: no JVD, no thyromegaly   CVS: S1 S2 no murmur no rubs no gallop  RESP: CTAB no wheeze, no rhonchi no rales  ABD: Soft, NT, ND, tympanic, no rebound or guarding   : No Lin, No CVA tenderness   EXT; no pedal edema, no cyanosis  MSK: No ML spinal tenderness, normal ROM   NEURO: AAOX3, no new focal deficits     LABS/ IMAGING                        13.4   5.47  )-----------( 172      ( 21 Mar 2023 05:58 )             41.2         03-21    140  |  101  |  15  ----------------------------<  133<H>  4.5   |  26  |  0.7    Ca    9.0      21 Mar 2023 05:58    TPro  6.3  /  Alb  3.9  /  TBili  0.2  /  DBili  x   /  AST  20  /  ALT  21  /  AlkPhos  81  03-21    CAPILLARY BLOOD GLUCOSE          LIVER FUNCTIONS - ( 21 Mar 2023 05:58 )  Alb: 3.9 g/dL / Pro: 6.3 g/dL / ALK PHOS: 81 U/L / ALT: 21 U/L / AST: 20 U/L / GGT: x                  BRADLY [id number]    Subjective/Interval History   -  dry cough, SOB better   - on room air.     ROS  - no chest pain     PHYSICAL EXAM  Vital Signs Last 24 Hrs  T(C): 36.3 (21 Mar 2023 05:23), Max: 36.4 (20 Mar 2023 21:09)  T(F): 97.4 (21 Mar 2023 05:23), Max: 97.5 (20 Mar 2023 21:09)  HR: 75 (21 Mar 2023 05:23) (75 - 128)  BP: 152/70 (21 Mar 2023 05:23) (142/82 - 184/84)  BP(mean): 105 (20 Mar 2023 18:39) (105 - 105)  RR: 18 (21 Mar 2023 05:23) (18 - 22)  SpO2: 91% (21 Mar 2023 13:39) (91% - 96%)    Parameters below as of 21 Mar 2023 13:39  Patient On (Oxygen Delivery Method): room air      GA : AAOX3, NAD   HEENT: PERRLA, EOMI  NECK: no JVD, no thyromegaly   CVS: S1 S2 no murmur no rubs no gallop  RESP: LLL wheeze, no rhonchi no rales  ABD: Soft, NT, ND, tympanic, no rebound or guarding   : No Lin, No CVA tenderness   EXT; no pedal edema, no cyanosis  MSK: No ML spinal tenderness, normal ROM   NEURO: AAOX3, no new focal deficits     LABS/ IMAGING                        13.4   5.47  )-----------( 172      ( 21 Mar 2023 05:58 )             41.2         03-21    140  |  101  |  15  ----------------------------<  133<H>  4.5   |  26  |  0.7    Ca    9.0      21 Mar 2023 05:58    TPro  6.3  /  Alb  3.9  /  TBili  0.2  /  DBili  x   /  AST  20  /  ALT  21  /  AlkPhos  81  03-21    CAPILLARY BLOOD GLUCOSE          LIVER FUNCTIONS - ( 21 Mar 2023 05:58 )  Alb: 3.9 g/dL / Pro: 6.3 g/dL / ALK PHOS: 81 U/L / ALT: 21 U/L / AST: 20 U/L / GGT: x

## 2023-03-21 NOTE — DISCHARGE NOTE PROVIDER - NSDCADMDATE_GEN_ALL_CORE_FT
Date: 10/21/2021      PATIENT:  Jean Carlos Suh  :          2006  LIOR:          10/22/2021    Dear Dr. Roni Cardenas:    I had the pleasure of seeing your patient, Jean Carlos Suh, for an initial consultation on 10/22/2021 in the [facility] at the [facility] in [place].  Please see below for my assessment and plan of care.    History of Present Illness:  Jean Carlos is a 14 year old boy with a history of autism spectrum disorder, ADHD, generalized anxiety disorder, and depression who is accompanied to this appointment by his mother.      's mother explained that they became more concerned about his weight once he had a period of very rapid weight loss and doubled his weight in about 9 months. Based on available growth chart data, weight increased from 73 lbs in 2019 to 149 lbs in 2020. It is difficult to identify what exactly contributed to this change, however, Mom does note that he had some medication changes (ex: Concerta stopped), may have had appetite changes during puberty, and all of this occurred during the onset of the COVID-19 pandemic. Jean Carlos has never met with a dietitian before.        Typical Food Day:  Breakfast: ramen, 2 fried eggs, banana; sometimes gets some food at school too - small container of cereal  Lunch: @ school; takes water bottle   Dinner: rice & veggies, 2 hot dogs; pot roast; spaghetti w/ meatballs; hash w/ sausage and roasted vegetables           Snacks: home around 3:30pm - crackers w/ hummus, tomatoes, Cheetos; soy yogurt;   Caloric beverages: mocha (at school or at home; 1 per day or a diet coke; school coffee shop 2 days per week); Simply [employer]ime;     Fast food/restaurant food: 3 time(s) per week; not usually fast food    Turkish Pepper - panang snider with tofu or green snider w/ tofu   Free or reduced lunch: No  Food insecurity:  No    Eating Behaviors:     Jean Carlos does engage in  the following eating behaviors: feels hungry all the time, eats when bored, eats to cope with negative emotions, sneaks/hides food, eats large amounts when not hungry, and overeats in evening hours. Jean Carlos after asks for more food at mealtimes and seems to be hungry right after finishing a meal.      Jean Carlos does NOT engage in the following eating behaviors: sneaks/hides food, eats until he feels uncomfortably full and eats in the middle of the night.      Activity History:  Jean Carlos does not participate in organized sports.  He has gym in school 5 times per week.  He does have a gym membership - the family recently joined the [email or web address]. Jean Carlos was going to the [employer] daily (and will use the weights and stationary bike when there). The family has a goal of going 3x per week. Outside of school and going to the gym, Jean Carlos will sometimes go for walks or ride his bike.     Sleep History:   Weekday: goes to bed at 9pm and wakes up at 6am   Weekend: goes to bed at 10:30pm and wakes up at 7am   ROS: positive for snoring, daytime sleepiness (no naps; has fallen asleep once at school this year), negative for pauses in breathing while sleeping      Past Medical History:   Surgeries:  No past surgical history on file.     Hospitalizations:  Mental health (most recent admission was several years ago)     Illness/Conditions:      - Autism spectrum disorder with disruptive behavior   - ADHD   - Generalized anxiety disorder, depression   - Jean Carlos has been working with Dr. Starr with peds psychiatry.     Current Medications:    Current Outpatient Rx   Medication Sig Dispense Refill     ARIPiprazole (ABILIFY) 5 MG tablet Take 1 tablet (5 mg) by mouth daily Can take 2.5 mg prn 45 tablet 1     Cholecalciferol (VITAMIN D3) 50 MCG (2000 UT) TABS Take 2,000 Units by mouth daily 90 tablet 1     FLUoxetine (PROZAC) 20 MG capsule Take 1 capsule (20 mg) by mouth daily 90 capsule 0     ibuprofen (ADVIL/MOTRIN) 200 MG capsule Take 400 mg by mouth every 6 hours as  "needed (headache)       melatonin 3 MG CAPS Take 5 mg by mouth At Bedtime        QUEtiapine (SEROQUEL) 50 MG tablet Take 1 tablet (50 mg) by mouth At Bedtime 30 tablet 1       Allergies:  No Known Allergies    Family History:   Hypertension:    MGF  Hypercholesterolemia:   None  T2DM:   None   Gestational diabetes:   None   Premature cardiovascular disease:  None   Obstructive sleep apnea:   None   Excess Weight:   Mom    Weight Loss Surgery:    None     Social History:   Jean Carlos lives with his mother, maternal aunt, maternal grandfather, and older sister (goes to college).  He is in 9th grade and is attending school in person. Jean Carlos's father is not involved in his care - he is court-ordered for supervised visits and does not use them.     Review of Systems: 10 point review of systems is as noted above. ROS is also positive for shortness of breath with exercise, stomach aches, diarrhea/constipation, bedwetting, daytime leakage of urine, foot pain, and behavioral concerns.     Physical Exam:  Weight:    Wt Readings from Last 4 Encounters:   10/22/21 76.8 kg (169 lb 6.4 oz) (94 %, Z= 1.56)*   07/19/21 73.8 kg (162 lb 12.8 oz) (93 %, Z= 1.47)*   06/29/21 72.6 kg (160 lb 0.9 oz) (92 %, Z= 1.41)*   05/24/21 74.2 kg (163 lb 9.3 oz) (94 %, Z= 1.55)*     * Growth percentiles are based on CDC (Boys, 2-20 Years) data.     Height:    Ht Readings from Last 2 Encounters:   10/22/21 1.712 m (5' 7.4\") (57 %, Z= 0.18)*   07/19/21 1.694 m (5' 6.69\") (55 %, Z= 0.13)*     * Growth percentiles are based on CDC (Boys, 2-20 Years) data.     Body Mass Index:  Body mass index is 26.22 kg/m .  Body Mass Index Percentile:  94 %ile (Z= 1.56) based on CDC (Boys, 2-20 Years) BMI-for-age based on BMI available as of 10/22/2021.  Vitals: /66 (BP Location: Right arm, Patient Position: Sitting, Cuff Size: Adult Regular)   Pulse 75   Ht 1.712 m (5' 7.4\")   Wt 76.8 kg (169 lb 6.4 oz)   BMI 26.22 kg/m    BP:  Blood pressure reading is in the " normal blood pressure range based on the 2017 AAP Clinical Practice Guideline.    Pupils equal, round and reactive to light; neck supple with no thyromegaly; lungs clear to auscultation; heart regular rate and rhythm; abdomen soft and non-tender, no appreciable hepatomegaly; full range of motion of hips and knees; skin no acanthosis nigricans at posterior neck or axillae.    Labs:    Results for JEAN CARLOS AMARAL (MRN [id number]) as of 10/21/2021 20:50   6/29/2021 13:54   Sodium 136   Potassium 4.0   Chloride 109   Carbon Dioxide 23   Urea Nitrogen 13   Creatinine 0.66   Calcium 9.0   Anion Gap 4   Albumin 4.2   Protein Total 8.0   Bilirubin Total 0.3   Alkaline Phosphatase 326   ALT 22   AST 20   Cholesterol 132   HDL Cholesterol 43 (L)   Hemoglobin A1C 5.1   LDL Cholesterol Calculated 48   Non HDL Cholesterol 89   T4 Free 0.86   Triglycerides 205 (H)   TSH 1.42   Vitamin D Deficiency screening 42   Glucose 82       Assessment:  Jean Carlos is a 14 year old boy with a history of ASD, ADHD, MEMO, depression, and a BMI in the overweight range. It seems that the primary contributors to Jean Carlos's weight status include:  strong hunger which may be due to a disorder in satiety regulation, psychopharmacological barriers (specifically atypical antipsychotic use), and neurobiological condition (specifically ADHD).  The foundation of treatment is behavioral modification to improve dietary and physical activity patterns.  In certain circumstances, more intensive interventions, such as psychotherapy and/or pharmacotherapy, are needed. Because Jean Carlos is on atypical antipsychotics which can be quite obesogenic, he may benefit from pharmacotherapy to offset these weight gain side effects. We discussed this briefly during our appointment but will focus on lifestyle modification therapy changes for now. We can continue to reassess need for additional pharmacotherapy at future appointments.       Given his weight status, Jean Carlos is at increased  risk for developing premature cardiovascular disease, type 2 diabetes and other obesity related co-morbid conditions. Weight management is essential for decreasing these risks. An appropriate weight management goal is weight stabilization in the context of increasing height or even just a slowed rate of weight gain.       Jean Carlos s current problem list reviewed today includes:    Encounter Diagnoses   Name Primary?     BMI (body mass index), pediatric, 85% to less than 95% for age Yes     Pediatric overweight      Attention deficit hyperactivity disorder (ADHD), combined type      Autism spectrum disorder      Anxiety      Depression, unspecified depression type        Care Plan:  Overweight: BMI 94th percentile   - Lifestyle modification therapy - Jean Carlos had an appointment with our dietitian today for nutrition education    - Pharmacotherapy - not started today    - Screening labs - labs from June 2021, as noted above      We are looking forward to seeing Jean Carlos for a follow-up dietitian visit in 2 and 4 weeks and a follow up visit with me in 6-8 weeks.     Assessment requiring an independent historian(s) - family - mother  60 minutes spent on the date of the encounter doing patient visit and discussion with other provider(s), specifically Haley Javed RD.      Thank you for allowing me to participate in the care of your patient.  Please do not hesitate to call me with questions or concerns.      Sincerely,    Juliana Thompson MD, MS    Pediatric Obesity Medicine    Department of Pediatrics  [facility]          CC  Copy to patient  Peri Suh   [address] [place] [address]   20-Mar-2023 16:50

## 2023-03-22 ENCOUNTER — TRANSCRIPTION ENCOUNTER (OUTPATIENT)
Age: 88
End: 2023-03-22

## 2023-03-22 VITALS
SYSTOLIC BLOOD PRESSURE: 125 MMHG | HEART RATE: 109 BPM | TEMPERATURE: 96 F | RESPIRATION RATE: 16 BRPM | DIASTOLIC BLOOD PRESSURE: 65 MMHG | OXYGEN SATURATION: 95 %

## 2023-03-22 LAB
ANION GAP SERPL CALC-SCNC: 10 MMOL/L — SIGNIFICANT CHANGE UP (ref 7–14)
BUN SERPL-MCNC: 13 MG/DL — SIGNIFICANT CHANGE UP (ref 10–20)
CALCIUM SERPL-MCNC: 8.9 MG/DL — SIGNIFICANT CHANGE UP (ref 8.4–10.5)
CHLORIDE SERPL-SCNC: 99 MMOL/L — SIGNIFICANT CHANGE UP (ref 98–110)
CO2 SERPL-SCNC: 29 MMOL/L — SIGNIFICANT CHANGE UP (ref 17–32)
CREAT SERPL-MCNC: 0.7 MG/DL — SIGNIFICANT CHANGE UP (ref 0.7–1.5)
EGFR: 83 ML/MIN/1.73M2 — SIGNIFICANT CHANGE UP
GLUCOSE SERPL-MCNC: 97 MG/DL — SIGNIFICANT CHANGE UP (ref 70–99)
HCT VFR BLD CALC: 42.5 % — SIGNIFICANT CHANGE UP (ref 37–47)
HGB BLD-MCNC: 13.8 G/DL — SIGNIFICANT CHANGE UP (ref 12–16)
MCHC RBC-ENTMCNC: 26.4 PG — LOW (ref 27–31)
MCHC RBC-ENTMCNC: 32.5 G/DL — SIGNIFICANT CHANGE UP (ref 32–37)
MCV RBC AUTO: 81.3 FL — SIGNIFICANT CHANGE UP (ref 81–99)
NRBC # BLD: 0 /100 WBCS — SIGNIFICANT CHANGE UP (ref 0–0)
PLATELET # BLD AUTO: 177 K/UL — SIGNIFICANT CHANGE UP (ref 130–400)
POTASSIUM SERPL-MCNC: 4.5 MMOL/L — SIGNIFICANT CHANGE UP (ref 3.5–5)
POTASSIUM SERPL-SCNC: 4.5 MMOL/L — SIGNIFICANT CHANGE UP (ref 3.5–5)
RBC # BLD: 5.23 M/UL — SIGNIFICANT CHANGE UP (ref 4.2–5.4)
RBC # FLD: 14.4 % — SIGNIFICANT CHANGE UP (ref 11.5–14.5)
SODIUM SERPL-SCNC: 138 MMOL/L — SIGNIFICANT CHANGE UP (ref 135–146)
WBC # BLD: 8.79 K/UL — SIGNIFICANT CHANGE UP (ref 4.8–10.8)
WBC # FLD AUTO: 8.79 K/UL — SIGNIFICANT CHANGE UP (ref 4.8–10.8)

## 2023-03-22 PROCEDURE — 99239 HOSP IP/OBS DSCHRG MGMT >30: CPT

## 2023-03-22 RX ADMIN — Medication 3 MILLILITER(S): at 08:10

## 2023-03-22 RX ADMIN — LEVETIRACETAM 500 MILLIGRAM(S): 250 TABLET, FILM COATED ORAL at 18:30

## 2023-03-22 RX ADMIN — BUDESONIDE AND FORMOTEROL FUMARATE DIHYDRATE 2 PUFF(S): 160; 4.5 AEROSOL RESPIRATORY (INHALATION) at 08:39

## 2023-03-22 RX ADMIN — Medication 25 MILLIGRAM(S): at 18:30

## 2023-03-22 RX ADMIN — Medication 25 MICROGRAM(S): at 05:26

## 2023-03-22 RX ADMIN — RIVAROXABAN 15 MILLIGRAM(S): KIT at 18:30

## 2023-03-22 RX ADMIN — LEVETIRACETAM 500 MILLIGRAM(S): 250 TABLET, FILM COATED ORAL at 05:26

## 2023-03-22 NOTE — DISCHARGE NOTE NURSING/CASE MANAGEMENT/SOCIAL WORK - PATIENT PORTAL LINK FT
You can access the FollowMyHealth Patient Portal offered by [facility] by registering at the following website: [email or web address]. By joining [facility]’s FollowMyHealth portal, you will also be able to view your health information using other applications (apps) compatible with our system.

## 2023-03-22 NOTE — DISCHARGE NOTE NURSING/CASE MANAGEMENT/SOCIAL WORK - NSDCPEFALRISK_GEN_ALL_CORE
For information on Fall & Injury Prevention, visit: [email or web address] OR  [email or web address] OR  https://www.cdc.gov/steadi/patient.html

## 2023-03-22 NOTE — PROGRESS NOTE ADULT - SUBJECTIVE AND OBJECTIVE BOX
Progress note    INTERVAL HPI/OVERNIGHT EVENTS:    Patient seen and examined at bedside. No acute distress. Breathing comfortably on room air. Coughing has improved.      REVIEW OF SYSTEMS:  All other 13 Review of systems were reviewed and are negative    FAMILY HISTORY:    T(C): 35.8 (03-22-23 @ 13:08), Max: 36.4 (03-22-23 @ 05:04)  HR: 109 (03-22-23 @ 13:08) (83 - 109)  BP: 125/65 (03-22-23 @ 13:08) (125/65 - 183/90)  RR: 16 (03-22-23 @ 13:08) (16 - 18)  SpO2: 95% (03-22-23 @ 13:08) (91% - 95%)  Wt(kg): --Vital Signs Last 24 Hrs  T(C): 35.8 (22 Mar 2023 13:08), Max: 36.4 (22 Mar 2023 05:04)  T(F): 96.5 (22 Mar 2023 13:08), Max: 97.6 (22 Mar 2023 05:04)  HR: 109 (22 Mar 2023 13:08) (83 - 109)  BP: 125/65 (22 Mar 2023 13:08) (125/65 - 183/90)  BP(mean): --  RR: 16 (22 Mar 2023 13:08) (16 - 18)  SpO2: 95% (22 Mar 2023 13:08) (91% - 95%)    Parameters below as of 22 Mar 2023 13:08  Patient On (Oxygen Delivery Method): room air      No Known Allergies      PHYSICAL EXAM:  GENERAL: NAD, well-groomed, well-developed  HEAD:  Atraumatic, Normocephalic  EYES: EOMI, PERRLA, conjunctiva and sclera clear  ENMT: No tonsillar erythema, exudates, or enlargement; Moist mucous membranes, Good dentition, No lesions  NECK: Supple, No JVD, Normal thyroid  NERVOUS SYSTEM:  Alert & Oriented X3, Good concentration; Motor Strength 5/5 B/L upper and lower extremities; DTRs 2+ intact and symmetric  CHEST/LUNG: Clear to percussion bilaterally; No rales, rhonchi, wheezing, or rubs  HEART: Regular rate and rhythm; No murmurs, rubs, or gallops  ABDOMEN: Soft, Nontender, Nondistended; Bowel sounds present  EXTREMITIES:  2+ Peripheral Pulses, No clubbing, cyanosis, or edema  LYMPH: No lymphadenopathy noted  SKIN: No rashes or lesions    Consultant(s) Notes Reviewed:  [x ] YES  [ ] NO  Care Discussed with Consultants/Other Providers [ x] YES  [ ] NO    LABS:      RADIOLOGY & ADDITIONAL TESTS:    Imaging Personally Reviewed:  [ ] YES  [ ] NO  acetaminophen     Tablet .. 650 milliGRAM(s) Oral every 6 hours PRN  albuterol    90 MICROgram(s) HFA Inhaler 2 Puff(s) Inhalation every 6 hours PRN  albuterol/ipratropium for Nebulization 3 milliLiter(s) Nebulizer every 12 hours  aluminum hydroxide/magnesium hydroxide/simethicone Suspension 30 milliLiter(s) Oral every 4 hours PRN  azithromycin  IVPB 500 milliGRAM(s) IV Intermittent every 24 hours  benzonatate 100 milliGRAM(s) Oral every 8 hours PRN  budesonide  80 MICROgram(s)/formoterol 4.5 MICROgram(s) Inhaler 2 Puff(s) Inhalation every 12 hours  cefTRIAXone   IVPB 1000 milliGRAM(s) IV Intermittent every 24 hours  levETIRAcetam 500 milliGRAM(s) Oral two times a day  levothyroxine 25 MICROGram(s) Oral daily  melatonin 3 milliGRAM(s) Oral at bedtime PRN  metoprolol succinate ER 25 milliGRAM(s) Oral <User Schedule>  ondansetron Injectable 4 milliGRAM(s) IV Push every 8 hours PRN  rivaroxaban 15 milliGRAM(s) Oral <User Schedule>      HEALTH ISSUES - PROBLEM Dx:    89yo female with PMHX Afib, COPD, HTN, Hypothyroid, bleeding ulcer and seizure presents with cough. Found to have L PNA.  Pt was initially in RVR but is now controlled      LLL. PNA  - Ceftriaxone and Zithro IV D3,  - supp O2 as needed for comfort and PO >92  - Nebs Q12H ATC   - IS 10x/h  - Tessalon perles 100 Q8 PRN  - monitor labs, now on RA.     Chronic Afib with RVR  - cont Metoprolol  - cont Xarelto  - HR now controlled     HTN  - monitor BP  - cont metoprolol     Hypothyroid  - cont Synthroid    Seizure  - precautions  - Keppra 500mg Q12    COPD/ Hx/o pulmonary nodules  - follow up with pulmonary   - cont Symbicort   - cont Albuterol PRN     DVT px   -cont xaretlo     For discharge today

## 2023-03-27 DIAGNOSIS — R91.1 SOLITARY PULMONARY NODULE: ICD-10-CM

## 2023-03-27 DIAGNOSIS — E03.9 HYPOTHYROIDISM, UNSPECIFIED: ICD-10-CM

## 2023-03-27 DIAGNOSIS — Z79.52 LONG TERM (CURRENT) USE OF SYSTEMIC STEROIDS: ICD-10-CM

## 2023-03-27 DIAGNOSIS — J44.0 CHRONIC OBSTRUCTIVE PULMONARY DISEASE WITH (ACUTE) LOWER RESPIRATORY INFECTION: ICD-10-CM

## 2023-03-27 DIAGNOSIS — R56.9 UNSPECIFIED CONVULSIONS: ICD-10-CM

## 2023-03-27 DIAGNOSIS — I10 ESSENTIAL (PRIMARY) HYPERTENSION: ICD-10-CM

## 2023-03-27 DIAGNOSIS — J18.9 PNEUMONIA, UNSPECIFIED ORGANISM: ICD-10-CM

## 2023-03-27 DIAGNOSIS — Z79.82 LONG TERM (CURRENT) USE OF ASPIRIN: ICD-10-CM

## 2023-03-27 DIAGNOSIS — Z79.01 LONG TERM (CURRENT) USE OF ANTICOAGULANTS: ICD-10-CM

## 2023-03-27 DIAGNOSIS — I48.20 CHRONIC ATRIAL FIBRILLATION, UNSPECIFIED: ICD-10-CM

## 2023-05-01 ENCOUNTER — RX RENEWAL (OUTPATIENT)
Age: 88
End: 2023-05-01

## 2023-05-04 ENCOUNTER — OUTPATIENT (OUTPATIENT)
Dept: OUTPATIENT SERVICES | Facility: HOSPITAL | Age: 88
LOS: 1 days | End: 2023-05-04
Payer: MEDICARE

## 2023-05-04 DIAGNOSIS — J47.9 BRONCHIECTASIS, UNCOMPLICATED: ICD-10-CM

## 2023-05-04 DIAGNOSIS — Z00.8 ENCOUNTER FOR OTHER GENERAL EXAMINATION: ICD-10-CM

## 2023-05-04 PROCEDURE — 71250 CT THORAX DX C-: CPT | Mod: 26

## 2023-05-04 PROCEDURE — 71250 CT THORAX DX C-: CPT

## 2023-05-05 DIAGNOSIS — J47.9 BRONCHIECTASIS, UNCOMPLICATED: ICD-10-CM

## 2023-07-05 ENCOUNTER — APPOINTMENT (OUTPATIENT)
Dept: ELECTROPHYSIOLOGY | Facility: CLINIC | Age: 88
End: 2023-07-05
Payer: MEDICARE

## 2023-07-05 VITALS
HEART RATE: 84 BPM | SYSTOLIC BLOOD PRESSURE: 126 MMHG | HEIGHT: 67 IN | BODY MASS INDEX: 25.43 KG/M2 | DIASTOLIC BLOOD PRESSURE: 82 MMHG | WEIGHT: 162 LBS

## 2023-07-05 PROCEDURE — 93000 ELECTROCARDIOGRAM COMPLETE: CPT

## 2023-07-05 PROCEDURE — 99214 OFFICE O/P EST MOD 30 MIN: CPT | Mod: 25

## 2023-07-06 NOTE — ADDENDUM
[FreeTextEntry1] : Liz DHILLON assisted in documentation on 07/06/2023 acting as a scribe for Dr. Bruce Pandey.\par

## 2023-07-06 NOTE — HISTORY OF PRESENT ILLNESS
[FreeTextEntry1] : Ms. MILLER is a 87 year-year old female with history of HTN, paroxysmal atrial fibrillation, PUD with UGIB, COPD, seizure, Hypothyroidism  is here for management of Atrial Fibrillation.\par \par PUD- ~ 2 years ago- thought to be due to 'bacteria' as per the patient.\par No further bleeding.\par Last Hgb 14.3\par \par + fatigue\par \par 07/05/2023: Here for occasional lower extremity swelling. Denies any palpitation or bleeding. Continuing anticoagulation without any trouble. \par \par Denies chest pain, shortness of breath, palpitation, dizziness or LOC except noted above.\par \par EKG (07/05/2023): SR\par EKG (06/01/22): AF@ 81, QRSd 78\par TTE (05/22): EF 62%, Mild MR/TR/AR\par Cardio: Dr. Arcos\par \par

## 2023-07-24 ENCOUNTER — APPOINTMENT (OUTPATIENT)
Dept: PULMONOLOGY | Facility: CLINIC | Age: 88
End: 2023-07-24
Payer: MEDICARE

## 2023-07-24 VITALS
SYSTOLIC BLOOD PRESSURE: 130 MMHG | HEIGHT: 66 IN | OXYGEN SATURATION: 98 % | DIASTOLIC BLOOD PRESSURE: 70 MMHG | BODY MASS INDEX: 26.52 KG/M2 | WEIGHT: 165 LBS | HEART RATE: 88 BPM

## 2023-07-24 PROCEDURE — 99214 OFFICE O/P EST MOD 30 MIN: CPT

## 2023-07-24 NOTE — REVIEW OF SYSTEMS
[Negative] : Endocrine [Cough] : no cough [Dyspnea] : no dyspnea [SOB on Exertion] : no sob on exertion [TextBox_30] : resolved; no cugh, no wheezing

## 2023-07-24 NOTE — HISTORY OF PRESENT ILLNESS
[TextBox_4] : 87-year-old female who smoked for about 30 years and quit 35 years ago, has a past medical history of hypertension, hypothyroidism, atrial fibrillation on Xarelto, seizures on Keppra, recently had COVID-19 viral pneumonia for which she was admitted to the hospital.  During her hospital stay she was placed on oxygen and received a course of remdesivir and dexamethasone.  She also is known to have history of bronchiectasis but there are no previous CT of the chest to review.  She is presenting here because she still feels short of breath and fatigue.  Her cough has essentially resolved.  Today we did a repeat chest x-ray in the office and that still shows a persistent right perihilar opacity that has not resolved.\par \par 7/24/23: Recent pneumonia s/p abx course. Now asymptomatic. Some crackles on exam. CT noted with stable bronchiectatic changes since 2022. No cough, no wheezing, no significant dyspnea. Will obtain CXR; previous macarena was normal; CT in 6 months. Symbicort discontinued and she is on albuterol prn which she rarely uses. \par

## 2023-08-22 ENCOUNTER — RESULT REVIEW (OUTPATIENT)
Age: 88
End: 2023-08-22

## 2023-08-22 ENCOUNTER — OUTPATIENT (OUTPATIENT)
Dept: OUTPATIENT SERVICES | Facility: HOSPITAL | Age: 88
LOS: 1 days | End: 2023-08-22
Payer: MEDICARE

## 2023-08-22 DIAGNOSIS — R07.9 CHEST PAIN, UNSPECIFIED: ICD-10-CM

## 2023-08-22 PROCEDURE — 71046 X-RAY EXAM CHEST 2 VIEWS: CPT | Mod: 26

## 2023-08-22 PROCEDURE — 71046 X-RAY EXAM CHEST 2 VIEWS: CPT

## 2023-08-23 DIAGNOSIS — R07.9 CHEST PAIN, UNSPECIFIED: ICD-10-CM

## 2023-09-11 ENCOUNTER — APPOINTMENT (OUTPATIENT)
Dept: CARDIOLOGY | Facility: CLINIC | Age: 88
End: 2023-09-11
Payer: MEDICARE

## 2023-09-11 VITALS — BODY MASS INDEX: 27.64 KG/M2 | WEIGHT: 172 LBS | HEIGHT: 66 IN | HEART RATE: 81 BPM

## 2023-09-11 VITALS — SYSTOLIC BLOOD PRESSURE: 118 MMHG | DIASTOLIC BLOOD PRESSURE: 80 MMHG | RESPIRATION RATE: 18 BRPM

## 2023-09-11 PROCEDURE — 99214 OFFICE O/P EST MOD 30 MIN: CPT | Mod: 25

## 2023-09-11 PROCEDURE — 93000 ELECTROCARDIOGRAM COMPLETE: CPT

## 2023-10-16 ENCOUNTER — APPOINTMENT (OUTPATIENT)
Dept: PULMONOLOGY | Facility: CLINIC | Age: 88
End: 2023-10-16
Payer: MEDICARE

## 2023-10-16 VITALS
WEIGHT: 162 LBS | SYSTOLIC BLOOD PRESSURE: 130 MMHG | HEIGHT: 67 IN | HEART RATE: 79 BPM | BODY MASS INDEX: 25.43 KG/M2 | OXYGEN SATURATION: 95 % | DIASTOLIC BLOOD PRESSURE: 80 MMHG

## 2023-10-16 DIAGNOSIS — R93.89 ABNORMAL FINDINGS ON DIAGNOSTIC IMAGING OF OTHER SPECIFIED BODY STRUCTURES: ICD-10-CM

## 2023-10-16 PROCEDURE — 99213 OFFICE O/P EST LOW 20 MIN: CPT

## 2024-01-16 ENCOUNTER — OUTPATIENT (OUTPATIENT)
Dept: OUTPATIENT SERVICES | Facility: HOSPITAL | Age: 89
LOS: 1 days | End: 2024-01-16
Payer: MEDICARE

## 2024-01-16 DIAGNOSIS — J47.9 BRONCHIECTASIS, UNCOMPLICATED: ICD-10-CM

## 2024-01-16 DIAGNOSIS — Z00.8 ENCOUNTER FOR OTHER GENERAL EXAMINATION: ICD-10-CM

## 2024-01-16 PROCEDURE — 71250 CT THORAX DX C-: CPT | Mod: 26

## 2024-01-16 PROCEDURE — 71250 CT THORAX DX C-: CPT

## 2024-01-17 DIAGNOSIS — J47.9 BRONCHIECTASIS, UNCOMPLICATED: ICD-10-CM

## 2024-01-22 ENCOUNTER — APPOINTMENT (OUTPATIENT)
Dept: PULMONOLOGY | Facility: CLINIC | Age: 89
End: 2024-01-22
Payer: MEDICARE

## 2024-01-22 VITALS
BODY MASS INDEX: 25.11 KG/M2 | DIASTOLIC BLOOD PRESSURE: 80 MMHG | WEIGHT: 160 LBS | HEIGHT: 67 IN | HEART RATE: 81 BPM | OXYGEN SATURATION: 94 % | SYSTOLIC BLOOD PRESSURE: 140 MMHG

## 2024-01-22 DIAGNOSIS — J47.9 BRONCHIECTASIS, UNCOMPLICATED: ICD-10-CM

## 2024-01-22 DIAGNOSIS — R91.8 OTHER NONSPECIFIC ABNORMAL FINDING OF LUNG FIELD: ICD-10-CM

## 2024-01-22 PROCEDURE — 99213 OFFICE O/P EST LOW 20 MIN: CPT

## 2024-01-22 NOTE — PHYSICAL EXAM
[No Acute Distress] : no acute distress [Normal Oropharynx] : normal oropharynx [Normal Appearance] : normal appearance [No Neck Mass] : no neck mass [No Murmurs] : no murmurs [No Abnormalities] : no abnormalities [Benign] : benign [Normal Gait] : normal gait [No Cyanosis] : no cyanosis [No Clubbing] : no clubbing [No Edema] : no edema [FROM] : FROM [No Focal Deficits] : no focal deficits [Normal Color/ Pigmentation] : normal color/ pigmentation [Oriented x3] : oriented x3 [Normal Affect] : normal affect [TextBox_54] : rate controlled [TextBox_68] : minimal wheeze

## 2024-01-22 NOTE — HISTORY OF PRESENT ILLNESS
[Former] : former [TextBox_4] : 87-year-old female who smoked for about 30 years and quit 35 years ago, has a past medical history of hypertension, hypothyroidism, atrial fibrillation on Xarelto, seizures on Keppra, recently had COVID-19 viral pneumonia for which she was admitted to the hospital. During her hospital stay she was placed on oxygen and received a course of remdesivir and dexamethasone. She also is known to have history of bronchiectasis but there are no previous CT of the chest to review. She is presenting here because she still feels short of breath and fatigue. Her cough has essentially resolved. Today we did a repeat chest x-ray in the office and that still shows a persistent right perihilar opacity that has not resolved.  1/22/24: Shortness of breath is stable. CT noted and is stable.

## 2024-01-22 NOTE — ASSESSMENT
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

## 2024-03-11 ENCOUNTER — APPOINTMENT (OUTPATIENT)
Dept: CARDIOLOGY | Facility: CLINIC | Age: 89
End: 2024-03-11
Payer: MEDICARE

## 2024-03-11 VITALS — WEIGHT: 173 LBS | HEIGHT: 67 IN | BODY MASS INDEX: 27.15 KG/M2

## 2024-03-11 VITALS — SYSTOLIC BLOOD PRESSURE: 118 MMHG | RESPIRATION RATE: 18 BRPM | DIASTOLIC BLOOD PRESSURE: 80 MMHG | HEART RATE: 80 BPM

## 2024-03-11 DIAGNOSIS — I48.91 UNSPECIFIED ATRIAL FIBRILLATION: ICD-10-CM

## 2024-03-11 DIAGNOSIS — I10 ESSENTIAL (PRIMARY) HYPERTENSION: ICD-10-CM

## 2024-03-11 DIAGNOSIS — U07.1 COVID-19: ICD-10-CM

## 2024-03-11 DIAGNOSIS — R06.02 SHORTNESS OF BREATH: ICD-10-CM

## 2024-03-11 DIAGNOSIS — E03.9 HYPOTHYROIDISM, UNSPECIFIED: ICD-10-CM

## 2024-03-11 PROCEDURE — 93000 ELECTROCARDIOGRAM COMPLETE: CPT

## 2024-03-11 PROCEDURE — 99214 OFFICE O/P EST MOD 30 MIN: CPT | Mod: 25

## 2024-03-11 NOTE — PHYSICAL EXAM
[Well Developed] : well developed [Well Nourished] : well nourished [No Acute Distress] : no acute distress [Normal Conjunctiva] : normal conjunctiva [Normal Venous Pressure] : normal venous pressure [No Carotid Bruit] : no carotid bruit [Normal S1, S2] : normal S1, S2 [No Murmur] : no murmur [No Rub] : no rub [No Gallop] : no gallop [Murmur] : murmur [Clear Lung Fields] : clear lung fields [Good Air Entry] : good air entry [Soft] : abdomen soft [No Respiratory Distress] : no respiratory distress  [No Masses/organomegaly] : no masses/organomegaly [Non Tender] : non-tender [Normal Bowel Sounds] : normal bowel sounds [Normal Gait] : normal gait [No Edema] : no edema [No Cyanosis] : no cyanosis [No Clubbing] : no clubbing [No Varicosities] : no varicosities [No Rash] : no rash [No Skin Lesions] : no skin lesions [Moves all extremities] : moves all extremities [No Focal Deficits] : no focal deficits [Normal Speech] : normal speech [Alert and Oriented] : alert and oriented [Normal memory] : normal memory [de-identified] : II VI systolic

## 2024-04-02 RX ORDER — ALBUTEROL SULFATE 90 UG/1
108 (90 BASE) INHALANT RESPIRATORY (INHALATION)
Qty: 1 | Refills: 3 | Status: ACTIVE | COMMUNITY
Start: 2023-07-24 | End: 1900-01-01

## 2024-05-16 RX ORDER — RIVAROXABAN 15 MG/1
15 TABLET, FILM COATED ORAL
Qty: 90 | Refills: 3 | Status: ACTIVE | COMMUNITY
Start: 2022-05-02 | End: 1900-01-01

## 2024-06-11 RX ORDER — BUDESONIDE AND FORMOTEROL FUMARATE DIHYDRATE 80; 4.5 UG/1; UG/1
80-4.5 AEROSOL RESPIRATORY (INHALATION) TWICE DAILY
Qty: 1 | Refills: 3 | Status: ACTIVE | COMMUNITY
Start: 2022-08-30 | End: 1900-01-01

## 2024-07-22 ENCOUNTER — APPOINTMENT (OUTPATIENT)
Dept: PULMONOLOGY | Facility: CLINIC | Age: 89
End: 2024-07-22
Payer: MEDICARE

## 2024-07-22 VITALS
DIASTOLIC BLOOD PRESSURE: 66 MMHG | SYSTOLIC BLOOD PRESSURE: 112 MMHG | WEIGHT: 165 LBS | HEART RATE: 86 BPM | HEIGHT: 67 IN | BODY MASS INDEX: 25.9 KG/M2 | OXYGEN SATURATION: 94 %

## 2024-07-22 DIAGNOSIS — R91.8 OTHER NONSPECIFIC ABNORMAL FINDING OF LUNG FIELD: ICD-10-CM

## 2024-07-22 DIAGNOSIS — J47.9 BRONCHIECTASIS, UNCOMPLICATED: ICD-10-CM

## 2024-07-22 DIAGNOSIS — R06.02 SHORTNESS OF BREATH: ICD-10-CM

## 2024-07-22 DIAGNOSIS — J98.4 OTHER DISORDERS OF LUNG: ICD-10-CM

## 2024-07-22 PROCEDURE — G2211 COMPLEX E/M VISIT ADD ON: CPT

## 2024-07-22 PROCEDURE — 99214 OFFICE O/P EST MOD 30 MIN: CPT

## 2024-07-22 RX ORDER — TIOTROPIUM BROMIDE INHALATION SPRAY 3.12 UG/1
2.5 SPRAY, METERED RESPIRATORY (INHALATION) DAILY
Qty: 1 | Refills: 3 | Status: ACTIVE | COMMUNITY
Start: 2024-07-22 | End: 1900-01-01

## 2024-07-22 NOTE — HISTORY OF PRESENT ILLNESS
[Former] : former [TextBox_4] : 87-year-old female who smoked for about 30 years and quit 35 years ago, has a past medical history of hypertension, hypothyroidism, atrial fibrillation on Xarelto, seizures on Keppra, recently had COVID-19 viral pneumonia for which she was admitted to the hospital. During her hospital stay she was placed on oxygen and received a course of remdesivir and dexamethasone. She also is known to have history of bronchiectasis but there are no previous CT of the chest to review. She is presenting here because she still feels short of breath and fatigue. Her cough has essentially resolved. Today we did a repeat chest x-ray in the office and that still shows a persistent right perihilar opacity that has not resolved.  Remains short of breath with any activity. CT however stable. States nothing wrong from CV standpoint. Lungs clear on exam. Likely combination of decreased lung function and deconditioning. Previous macarena suggestive of restriction.

## 2024-07-22 NOTE — PHYSICAL EXAM
[No Acute Distress] : no acute distress [Normal Oropharynx] : normal oropharynx [Normal Appearance] : normal appearance [No Neck Mass] : no neck mass [No Murmurs] : no murmurs [No Abnormalities] : no abnormalities [Benign] : benign [Normal Gait] : normal gait [No Clubbing] : no clubbing [No Cyanosis] : no cyanosis [No Edema] : no edema [FROM] : FROM [Normal Color/ Pigmentation] : normal color/ pigmentation [No Focal Deficits] : no focal deficits [Oriented x3] : oriented x3 [Normal Affect] : normal affect [TextBox_54] : rate controlled [TextBox_68] : minimal wheeze

## 2024-07-22 NOTE — ASSESSMENT
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

## 2024-09-09 ENCOUNTER — APPOINTMENT (OUTPATIENT)
Dept: CARDIOLOGY | Facility: CLINIC | Age: 89
End: 2024-09-09
Payer: MEDICARE

## 2024-09-09 VITALS — BODY MASS INDEX: 26.68 KG/M2 | HEIGHT: 67 IN | WEIGHT: 170 LBS | HEART RATE: 78 BPM

## 2024-09-09 VITALS — RESPIRATION RATE: 18 BRPM | DIASTOLIC BLOOD PRESSURE: 70 MMHG | SYSTOLIC BLOOD PRESSURE: 120 MMHG

## 2024-09-09 DIAGNOSIS — R06.02 SHORTNESS OF BREATH: ICD-10-CM

## 2024-09-09 DIAGNOSIS — E03.9 HYPOTHYROIDISM, UNSPECIFIED: ICD-10-CM

## 2024-09-09 DIAGNOSIS — I10 ESSENTIAL (PRIMARY) HYPERTENSION: ICD-10-CM

## 2024-09-09 DIAGNOSIS — J98.4 OTHER DISORDERS OF LUNG: ICD-10-CM

## 2024-09-09 DIAGNOSIS — I48.91 UNSPECIFIED ATRIAL FIBRILLATION: ICD-10-CM

## 2024-09-09 PROCEDURE — 93000 ELECTROCARDIOGRAM COMPLETE: CPT

## 2024-09-09 PROCEDURE — 99214 OFFICE O/P EST MOD 30 MIN: CPT

## 2024-09-09 NOTE — PHYSICAL EXAM
[Well Developed] : well developed [Well Nourished] : well nourished [No Acute Distress] : no acute distress [Normal Conjunctiva] : normal conjunctiva [Normal Venous Pressure] : normal venous pressure [No Carotid Bruit] : no carotid bruit [Normal S1, S2] : normal S1, S2 [No Murmur] : no murmur [No Rub] : no rub [No Gallop] : no gallop [Murmur] : murmur [Clear Lung Fields] : clear lung fields [Good Air Entry] : good air entry [No Respiratory Distress] : no respiratory distress  [Soft] : abdomen soft [Non Tender] : non-tender [No Masses/organomegaly] : no masses/organomegaly [Normal Bowel Sounds] : normal bowel sounds [Normal Gait] : normal gait [No Edema] : no edema [No Cyanosis] : no cyanosis [No Clubbing] : no clubbing [No Varicosities] : no varicosities [No Rash] : no rash [No Skin Lesions] : no skin lesions [Moves all extremities] : moves all extremities [No Focal Deficits] : no focal deficits [Normal Speech] : normal speech [Alert and Oriented] : alert and oriented [Normal memory] : normal memory [de-identified] : II VI systolic

## 2024-10-02 ENCOUNTER — APPOINTMENT (OUTPATIENT)
Dept: CARDIOLOGY | Facility: CLINIC | Age: 89
End: 2024-10-02
Payer: MEDICARE

## 2024-10-02 DIAGNOSIS — I48.91 UNSPECIFIED ATRIAL FIBRILLATION: ICD-10-CM

## 2024-10-02 PROCEDURE — 93306 TTE W/DOPPLER COMPLETE: CPT

## 2025-01-29 ENCOUNTER — APPOINTMENT (OUTPATIENT)
Dept: PULMONOLOGY | Facility: CLINIC | Age: 89
End: 2025-01-29
Payer: MEDICARE

## 2025-01-29 VITALS
DIASTOLIC BLOOD PRESSURE: 70 MMHG | OXYGEN SATURATION: 95 % | BODY MASS INDEX: 26.63 KG/M2 | RESPIRATION RATE: 16 BRPM | SYSTOLIC BLOOD PRESSURE: 130 MMHG | WEIGHT: 170 LBS | HEART RATE: 105 BPM

## 2025-01-29 DIAGNOSIS — J98.4 OTHER DISORDERS OF LUNG: ICD-10-CM

## 2025-01-29 DIAGNOSIS — R91.8 OTHER NONSPECIFIC ABNORMAL FINDING OF LUNG FIELD: ICD-10-CM

## 2025-01-29 DIAGNOSIS — R06.02 SHORTNESS OF BREATH: ICD-10-CM

## 2025-01-29 DIAGNOSIS — J47.9 BRONCHIECTASIS, UNCOMPLICATED: ICD-10-CM

## 2025-01-29 DIAGNOSIS — R49.0 DYSPHONIA: ICD-10-CM

## 2025-01-29 PROCEDURE — 99214 OFFICE O/P EST MOD 30 MIN: CPT

## 2025-01-29 PROCEDURE — G2211 COMPLEX E/M VISIT ADD ON: CPT

## 2025-03-12 ENCOUNTER — NON-APPOINTMENT (OUTPATIENT)
Age: 89
End: 2025-03-12

## 2025-03-12 ENCOUNTER — APPOINTMENT (OUTPATIENT)
Dept: CARDIOLOGY | Facility: CLINIC | Age: 89
End: 2025-03-12
Payer: MEDICARE

## 2025-03-12 VITALS — HEART RATE: 74 BPM | DIASTOLIC BLOOD PRESSURE: 80 MMHG | SYSTOLIC BLOOD PRESSURE: 120 MMHG | RESPIRATION RATE: 18 BRPM

## 2025-03-12 DIAGNOSIS — E03.9 HYPOTHYROIDISM, UNSPECIFIED: ICD-10-CM

## 2025-03-12 DIAGNOSIS — J98.4 OTHER DISORDERS OF LUNG: ICD-10-CM

## 2025-03-12 DIAGNOSIS — I48.91 UNSPECIFIED ATRIAL FIBRILLATION: ICD-10-CM

## 2025-03-12 DIAGNOSIS — R06.02 SHORTNESS OF BREATH: ICD-10-CM

## 2025-03-12 DIAGNOSIS — I10 ESSENTIAL (PRIMARY) HYPERTENSION: ICD-10-CM

## 2025-03-12 PROCEDURE — 93000 ELECTROCARDIOGRAM COMPLETE: CPT

## 2025-03-12 PROCEDURE — 99214 OFFICE O/P EST MOD 30 MIN: CPT

## 2025-03-12 PROCEDURE — G2211 COMPLEX E/M VISIT ADD ON: CPT

## 2025-07-28 ENCOUNTER — APPOINTMENT (OUTPATIENT)
Dept: PULMONOLOGY | Facility: CLINIC | Age: 89
End: 2025-07-28
Payer: MEDICARE

## 2025-07-28 VITALS
BODY MASS INDEX: 26.63 KG/M2 | OXYGEN SATURATION: 93 % | SYSTOLIC BLOOD PRESSURE: 120 MMHG | DIASTOLIC BLOOD PRESSURE: 68 MMHG | WEIGHT: 170 LBS | HEART RATE: 83 BPM

## 2025-07-28 DIAGNOSIS — R06.02 SHORTNESS OF BREATH: ICD-10-CM

## 2025-07-28 DIAGNOSIS — J47.9 BRONCHIECTASIS, UNCOMPLICATED: ICD-10-CM

## 2025-07-28 DIAGNOSIS — R91.8 OTHER NONSPECIFIC ABNORMAL FINDING OF LUNG FIELD: ICD-10-CM

## 2025-07-28 DIAGNOSIS — J98.4 OTHER DISORDERS OF LUNG: ICD-10-CM

## 2025-07-28 PROCEDURE — 99214 OFFICE O/P EST MOD 30 MIN: CPT

## 2025-07-28 PROCEDURE — G2211 COMPLEX E/M VISIT ADD ON: CPT

## 2025-09-10 ENCOUNTER — APPOINTMENT (OUTPATIENT)
Dept: CARDIOLOGY | Facility: CLINIC | Age: 89
End: 2025-09-10